# Patient Record
Sex: MALE | Race: WHITE | NOT HISPANIC OR LATINO
[De-identification: names, ages, dates, MRNs, and addresses within clinical notes are randomized per-mention and may not be internally consistent; named-entity substitution may affect disease eponyms.]

---

## 2017-01-03 ENCOUNTER — APPOINTMENT (OUTPATIENT)
Dept: DERMATOLOGY | Facility: CLINIC | Age: 67
End: 2017-01-03

## 2017-01-03 VITALS
DIASTOLIC BLOOD PRESSURE: 80 MMHG | BODY MASS INDEX: 26.07 KG/M2 | WEIGHT: 176 LBS | HEIGHT: 69 IN | SYSTOLIC BLOOD PRESSURE: 150 MMHG

## 2017-01-13 ENCOUNTER — APPOINTMENT (OUTPATIENT)
Dept: INTERNAL MEDICINE | Facility: CLINIC | Age: 67
End: 2017-01-13

## 2017-01-13 VITALS
HEART RATE: 81 BPM | DIASTOLIC BLOOD PRESSURE: 81 MMHG | BODY MASS INDEX: 26.66 KG/M2 | HEIGHT: 69 IN | WEIGHT: 180 LBS | SYSTOLIC BLOOD PRESSURE: 140 MMHG | OXYGEN SATURATION: 97 % | RESPIRATION RATE: 12 BRPM

## 2017-01-13 DIAGNOSIS — N52.9 MALE ERECTILE DYSFUNCTION, UNSPECIFIED: ICD-10-CM

## 2017-02-17 LAB
25(OH)D3 SERPL-MCNC: 25.5 NG/ML
ALBUMIN SERPL ELPH-MCNC: 4.3 G/DL
ALP BLD-CCNC: 55 U/L
ALT SERPL-CCNC: 26 U/L
ANION GAP SERPL CALC-SCNC: 20 MMOL/L
AST SERPL-CCNC: 29 U/L
BILIRUB SERPL-MCNC: 0.6 MG/DL
BUN SERPL-MCNC: 23 MG/DL
CALCIUM SERPL-MCNC: 9.6 MG/DL
CHLORIDE SERPL-SCNC: 100 MMOL/L
CHOLEST SERPL-MCNC: 193 MG/DL
CHOLEST/HDLC SERPL: 2.9 RATIO
CO2 SERPL-SCNC: 21 MMOL/L
CREAT SERPL-MCNC: 1 MG/DL
GLUCOSE SERPL-MCNC: 88 MG/DL
HBA1C MFR BLD HPLC: 5.3 %
HDLC SERPL-MCNC: 66 MG/DL
LDLC SERPL CALC-MCNC: NORMAL MG/DL
POTASSIUM SERPL-SCNC: 4.5 MMOL/L
PROT SERPL-MCNC: 7.2 G/DL
PSA FREE FLD-MCNC: 30.4 %
PSA FREE SERPL-MCNC: 0.2 NG/ML
PSA SERPL-MCNC: 0.66 NG/ML
SODIUM SERPL-SCNC: 141 MMOL/L
TRIGL SERPL-MCNC: 424 MG/DL

## 2017-03-01 ENCOUNTER — MEDICATION RENEWAL (OUTPATIENT)
Age: 67
End: 2017-03-01

## 2017-03-03 ENCOUNTER — MEDICATION RENEWAL (OUTPATIENT)
Age: 67
End: 2017-03-03

## 2017-03-17 ENCOUNTER — RX RENEWAL (OUTPATIENT)
Age: 67
End: 2017-03-17

## 2017-03-20 ENCOUNTER — MEDICATION RENEWAL (OUTPATIENT)
Age: 67
End: 2017-03-20

## 2017-04-17 ENCOUNTER — EMERGENCY (EMERGENCY)
Facility: HOSPITAL | Age: 67
LOS: 1 days | Discharge: HOME | End: 2017-04-17
Admitting: EMERGENCY MEDICINE
Payer: MEDICAID

## 2017-04-17 ENCOUNTER — APPOINTMENT (OUTPATIENT)
Dept: ORTHOPEDIC SURGERY | Facility: CLINIC | Age: 67
End: 2017-04-17

## 2017-04-17 VITALS — WEIGHT: 180 LBS | BODY MASS INDEX: 26.66 KG/M2 | HEIGHT: 69 IN

## 2017-04-17 DIAGNOSIS — Z87.39 PERSONAL HISTORY OF OTHER DISEASES OF THE MUSCULOSKELETAL SYSTEM AND CONNECTIVE TISSUE: ICD-10-CM

## 2017-04-17 PROCEDURE — 99283 EMERGENCY DEPT VISIT LOW MDM: CPT | Mod: 25

## 2017-04-17 PROCEDURE — 73140 X-RAY EXAM OF FINGER(S): CPT

## 2017-04-17 PROCEDURE — 73140 X-RAY EXAM OF FINGER(S): CPT | Mod: 26,RT

## 2017-04-20 ENCOUNTER — OUTPATIENT (OUTPATIENT)
Dept: OUTPATIENT SERVICES | Facility: HOSPITAL | Age: 67
LOS: 1 days | End: 2017-04-20
Payer: MEDICAID

## 2017-04-20 ENCOUNTER — APPOINTMENT (OUTPATIENT)
Dept: MRI IMAGING | Facility: HOSPITAL | Age: 67
End: 2017-04-20

## 2017-04-20 PROCEDURE — 73221 MRI JOINT UPR EXTREM W/O DYE: CPT

## 2017-04-21 ENCOUNTER — APPOINTMENT (OUTPATIENT)
Dept: ORTHOPEDIC SURGERY | Facility: CLINIC | Age: 67
End: 2017-04-21

## 2017-04-21 VITALS
HEART RATE: 87 BPM | SYSTOLIC BLOOD PRESSURE: 161 MMHG | WEIGHT: 180 LBS | DIASTOLIC BLOOD PRESSURE: 68 MMHG | BODY MASS INDEX: 26.66 KG/M2 | HEIGHT: 69 IN

## 2017-04-26 ENCOUNTER — APPOINTMENT (OUTPATIENT)
Dept: ORTHOPEDIC SURGERY | Facility: CLINIC | Age: 67
End: 2017-04-26

## 2017-04-26 VITALS
WEIGHT: 180 LBS | DIASTOLIC BLOOD PRESSURE: 76 MMHG | HEIGHT: 69 IN | BODY MASS INDEX: 26.66 KG/M2 | SYSTOLIC BLOOD PRESSURE: 126 MMHG | HEART RATE: 81 BPM

## 2017-05-01 ENCOUNTER — OUTPATIENT (OUTPATIENT)
Dept: OUTPATIENT SERVICES | Facility: HOSPITAL | Age: 67
LOS: 1 days | End: 2017-05-01
Payer: MEDICAID

## 2017-05-01 VITALS — HEIGHT: 69 IN | WEIGHT: 176.37 LBS | TEMPERATURE: 98 F

## 2017-05-01 DIAGNOSIS — Z90.89 ACQUIRED ABSENCE OF OTHER ORGANS: Chronic | ICD-10-CM

## 2017-05-01 DIAGNOSIS — S62.90XP: ICD-10-CM

## 2017-05-01 DIAGNOSIS — S52.90XA UNSPECIFIED FRACTURE OF UNSPECIFIED FOREARM, INITIAL ENCOUNTER FOR CLOSED FRACTURE: ICD-10-CM

## 2017-05-01 DIAGNOSIS — Z01.818 ENCOUNTER FOR OTHER PREPROCEDURAL EXAMINATION: ICD-10-CM

## 2017-05-01 PROCEDURE — 80048 BASIC METABOLIC PNL TOTAL CA: CPT

## 2017-05-01 PROCEDURE — 85027 COMPLETE CBC AUTOMATED: CPT

## 2017-05-01 PROCEDURE — 36415 COLL VENOUS BLD VENIPUNCTURE: CPT

## 2017-05-01 PROCEDURE — 93005 ELECTROCARDIOGRAM TRACING: CPT

## 2017-05-01 PROCEDURE — G0463: CPT

## 2017-05-01 PROCEDURE — 93010 ELECTROCARDIOGRAM REPORT: CPT | Mod: NC

## 2017-05-01 RX ORDER — ADALIMUMAB 40MG/0.8ML
0 KIT SUBCUTANEOUS
Qty: 0 | Refills: 0 | COMMUNITY

## 2017-05-01 NOTE — H&P PST ADULT - HISTORY OF PRESENT ILLNESS
This is a 66 y/o male who sustained left distal radius fracture s/p fall  presents with complaint of left wrist pain ,and limited ROM . patient states he fractured his wrist 3 years ago and applied cast that time . xray revealed non union fracture as per patient . scheduled for corrective osteotomy with ORIF and bone grafting left distal radius This is a 66 y/o male who sustained left distal radius fracture s/p fall  presents with complaint of left wrist pain ,and limited ROM . patient states he fractured his wrist 3 years ago and cast was applied  that time . x-ray revealed non union fracture as per patient . scheduled for corrective osteotomy with ORIF and bone grafting left distal radius

## 2017-05-01 NOTE — H&P PST ADULT - FAMILY HISTORY
Father  Still living? Yes, Estimated age: 81-90  Family history of diabetes mellitus in father, Age at diagnosis: Age Unknown

## 2017-05-01 NOTE — H&P PST ADULT - PROBLEM SELECTOR PLAN 1
corrective osteotomy with ORIF and bone grafting left distal radius   Medical clearance  Pre p instructions

## 2017-05-05 PROCEDURE — 73630 X-RAY EXAM OF FOOT: CPT | Mod: 26,RT

## 2017-05-05 PROCEDURE — 73620 X-RAY EXAM OF FOOT: CPT | Mod: 26,RT

## 2017-05-06 ENCOUNTER — INPATIENT (INPATIENT)
Facility: HOSPITAL | Age: 67
LOS: 0 days | Discharge: HOME | DRG: 603 | End: 2017-05-07
Attending: INTERNAL MEDICINE | Admitting: INTERNAL MEDICINE
Payer: MEDICAID

## 2017-05-06 DIAGNOSIS — Z90.89 ACQUIRED ABSENCE OF OTHER ORGANS: Chronic | ICD-10-CM
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
## 2017-05-06 DIAGNOSIS — M06.9 RHEUMATOID ARTHRITIS, UNSPECIFIED: ICD-10-CM
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
## 2017-05-06 DIAGNOSIS — D69.6 THROMBOCYTOPENIA, UNSPECIFIED: ICD-10-CM

## 2017-05-06 DIAGNOSIS — L03.115 CELLULITIS OF RIGHT LOWER LIMB: ICD-10-CM

## 2017-05-06 DIAGNOSIS — I10 ESSENTIAL (PRIMARY) HYPERTENSION: ICD-10-CM

## 2017-05-06 DIAGNOSIS — S91.339A PUNCTURE WOUND WITHOUT FOREIGN BODY, UNSPECIFIED FOOT, INITIAL ENCOUNTER: ICD-10-CM

## 2017-05-06 DIAGNOSIS — S91.331A PUNCTURE WOUND WITHOUT FOREIGN BODY, RIGHT FOOT, INITIAL ENCOUNTER: ICD-10-CM

## 2017-05-06 PROCEDURE — 99222 1ST HOSP IP/OBS MODERATE 55: CPT

## 2017-05-08 ENCOUNTER — APPOINTMENT (OUTPATIENT)
Age: 67
End: 2017-05-08

## 2017-05-08 VITALS
RESPIRATION RATE: 12 BRPM | HEIGHT: 69 IN | HEART RATE: 86 BPM | BODY MASS INDEX: 25.48 KG/M2 | DIASTOLIC BLOOD PRESSURE: 72 MMHG | TEMPERATURE: 98.7 F | SYSTOLIC BLOOD PRESSURE: 138 MMHG | WEIGHT: 172 LBS

## 2017-05-08 LAB
BASOPHILS # BLD AUTO: 0.01 K/UL
BASOPHILS NFR BLD AUTO: 0.1 %
EOSINOPHIL # BLD AUTO: 0.14 K/UL
EOSINOPHIL NFR BLD AUTO: 2.1 %
HCT VFR BLD CALC: 43.7 %
HGB BLD-MCNC: 15 G/DL
IMM GRANULOCYTES NFR BLD AUTO: 0.7 %
LYMPHOCYTES # BLD AUTO: 2.2 K/UL
LYMPHOCYTES NFR BLD AUTO: 32.6 %
MAN DIFF?: NORMAL
MCHC RBC-ENTMCNC: 32.5 PG
MCHC RBC-ENTMCNC: 34.3 GM/DL
MCV RBC AUTO: 94.6 FL
MONOCYTES # BLD AUTO: 0.48 K/UL
MONOCYTES NFR BLD AUTO: 7.1 %
NEUTROPHILS # BLD AUTO: 3.87 K/UL
NEUTROPHILS NFR BLD AUTO: 57.4 %
PLATELET # BLD AUTO: 130 K/UL
RBC # BLD: 4.62 M/UL
RBC # FLD: 12.5 %
WBC # FLD AUTO: 6.75 K/UL

## 2017-05-09 LAB
ALBUMIN SERPL ELPH-MCNC: 4.5 G/DL
ALP BLD-CCNC: 62 U/L
ALT SERPL-CCNC: 27 U/L
ANION GAP SERPL CALC-SCNC: 20 MMOL/L
AST SERPL-CCNC: 30 U/L
BILIRUB SERPL-MCNC: 0.6 MG/DL
BUN SERPL-MCNC: 28 MG/DL
CALCIUM SERPL-MCNC: 10.1 MG/DL
CHLORIDE SERPL-SCNC: 103 MMOL/L
CO2 SERPL-SCNC: 20 MMOL/L
CREAT SERPL-MCNC: 1.17 MG/DL
HCV RNA FLD QL NAA+PROBE: NORMAL
HCV RNA SPEC QL PROBE+SIG AMP: NOT DETECTED
POTASSIUM SERPL-SCNC: 4.5 MMOL/L
PROT SERPL-MCNC: 7.7 G/DL
SODIUM SERPL-SCNC: 143 MMOL/L

## 2017-05-10 ENCOUNTER — APPOINTMENT (OUTPATIENT)
Dept: ORTHOPEDIC SURGERY | Facility: CLINIC | Age: 67
End: 2017-05-10

## 2017-05-10 NOTE — ASU DISCHARGE PLAN (ADULT/PEDIATRIC). - MEDICATION SUMMARY - MEDICATIONS TO STOP TAKING
I will STOP taking the medications listed below when I get home from the hospital:    HYDROcodone-acetaminophen 7.5 mg-325 mg oral tablet  -- 1 tab(s) by mouth every 6 hours, As Needed

## 2017-05-10 NOTE — ASU DISCHARGE PLAN (ADULT/PEDIATRIC). - NOTIFY
Pain not relieved by Medications/Bleeding that does not stop/Fever greater than 101/Numbness, color, or temperature change to extremity

## 2017-05-11 ENCOUNTER — APPOINTMENT (OUTPATIENT)
Dept: FAMILY MEDICINE | Facility: CLINIC | Age: 67
End: 2017-05-11

## 2017-05-11 VITALS
TEMPERATURE: 98.7 F | WEIGHT: 180 LBS | OXYGEN SATURATION: 99 % | RESPIRATION RATE: 12 BRPM | BODY MASS INDEX: 29.99 KG/M2 | SYSTOLIC BLOOD PRESSURE: 143 MMHG | HEART RATE: 77 BPM | HEIGHT: 65 IN | DIASTOLIC BLOOD PRESSURE: 84 MMHG

## 2017-05-13 ENCOUNTER — APPOINTMENT (OUTPATIENT)
Dept: ULTRASOUND IMAGING | Facility: HOSPITAL | Age: 67
End: 2017-05-13

## 2017-05-13 ENCOUNTER — OUTPATIENT (OUTPATIENT)
Dept: OUTPATIENT SERVICES | Facility: HOSPITAL | Age: 67
LOS: 1 days | End: 2017-05-13
Payer: MEDICAID

## 2017-05-13 DIAGNOSIS — Z90.89 ACQUIRED ABSENCE OF OTHER ORGANS: Chronic | ICD-10-CM

## 2017-05-13 DIAGNOSIS — B19.20 UNSPECIFIED VIRAL HEPATITIS C WITHOUT HEPATIC COMA: ICD-10-CM

## 2017-05-13 DIAGNOSIS — K80.21 CALCULUS OF GALLBLADDER WITHOUT CHOLECYSTITIS WITH OBSTRUCTION: ICD-10-CM

## 2017-05-13 PROCEDURE — 76700 US EXAM ABDOM COMPLETE: CPT

## 2017-05-16 ENCOUNTER — RESULT REVIEW (OUTPATIENT)
Age: 67
End: 2017-05-16

## 2017-05-16 ENCOUNTER — OUTPATIENT (OUTPATIENT)
Dept: OUTPATIENT SERVICES | Facility: HOSPITAL | Age: 67
LOS: 1 days | End: 2017-05-16
Payer: MEDICAID

## 2017-05-16 ENCOUNTER — APPOINTMENT (OUTPATIENT)
Dept: ORTHOPEDIC SURGERY | Facility: HOSPITAL | Age: 67
End: 2017-05-16

## 2017-05-16 VITALS
HEART RATE: 78 BPM | WEIGHT: 175.27 LBS | RESPIRATION RATE: 16 BRPM | SYSTOLIC BLOOD PRESSURE: 126 MMHG | HEIGHT: 69 IN | TEMPERATURE: 98 F | DIASTOLIC BLOOD PRESSURE: 76 MMHG | OXYGEN SATURATION: 96 %

## 2017-05-16 VITALS — OXYGEN SATURATION: 100 % | HEART RATE: 82 BPM | RESPIRATION RATE: 16 BRPM

## 2017-05-16 DIAGNOSIS — Z01.818 ENCOUNTER FOR OTHER PREPROCEDURAL EXAMINATION: ICD-10-CM

## 2017-05-16 DIAGNOSIS — Z90.89 ACQUIRED ABSENCE OF OTHER ORGANS: Chronic | ICD-10-CM

## 2017-05-16 DIAGNOSIS — S62.90XP: ICD-10-CM

## 2017-05-16 PROCEDURE — 25607 OPTX DST RD XARTC FX/EPI SEP: CPT | Mod: LT

## 2017-05-16 PROCEDURE — C1889: CPT

## 2017-05-16 PROCEDURE — 76000 FLUOROSCOPY <1 HR PHYS/QHP: CPT | Mod: 26

## 2017-05-16 PROCEDURE — 88305 TISSUE EXAM BY PATHOLOGIST: CPT

## 2017-05-16 PROCEDURE — 88311 DECALCIFY TISSUE: CPT

## 2017-05-16 PROCEDURE — 25400 REPAIR RADIUS OR ULNA: CPT | Mod: LT

## 2017-05-16 PROCEDURE — C1713: CPT

## 2017-05-16 PROCEDURE — 88311 DECALCIFY TISSUE: CPT | Mod: 26

## 2017-05-16 PROCEDURE — 88305 TISSUE EXAM BY PATHOLOGIST: CPT | Mod: 26

## 2017-05-16 PROCEDURE — 76000 FLUOROSCOPY <1 HR PHYS/QHP: CPT

## 2017-05-16 RX ORDER — ONDANSETRON 8 MG/1
4 TABLET, FILM COATED ORAL ONCE
Qty: 0 | Refills: 0 | Status: DISCONTINUED | OUTPATIENT
Start: 2017-05-16 | End: 2017-05-17

## 2017-05-16 RX ORDER — CEFAZOLIN SODIUM 1 G
2000 VIAL (EA) INJECTION ONCE
Qty: 0 | Refills: 0 | Status: COMPLETED | OUTPATIENT
Start: 2017-05-16 | End: 2017-05-16

## 2017-05-16 RX ORDER — HYDROMORPHONE HYDROCHLORIDE 2 MG/ML
0.5 INJECTION INTRAMUSCULAR; INTRAVENOUS; SUBCUTANEOUS
Qty: 0 | Refills: 0 | Status: DISCONTINUED | OUTPATIENT
Start: 2017-05-16 | End: 2017-05-17

## 2017-05-16 RX ORDER — SODIUM CHLORIDE 9 MG/ML
1000 INJECTION, SOLUTION INTRAVENOUS
Qty: 0 | Refills: 0 | Status: DISCONTINUED | OUTPATIENT
Start: 2017-05-16 | End: 2017-05-17

## 2017-05-16 RX ORDER — BENZOCAINE AND MENTHOL 5; 1 G/100ML; G/100ML
1 LIQUID ORAL ONCE
Qty: 0 | Refills: 0 | Status: COMPLETED | OUTPATIENT
Start: 2017-05-16 | End: 2017-05-16

## 2017-05-16 RX ADMIN — SODIUM CHLORIDE 100 MILLILITER(S): 9 INJECTION, SOLUTION INTRAVENOUS at 20:27

## 2017-05-16 RX ADMIN — BENZOCAINE AND MENTHOL 1 LOZENGE: 5; 1 LIQUID ORAL at 20:33

## 2017-05-16 NOTE — BRIEF OPERATIVE NOTE - POST-OP DX
Other closed extra-articular fracture of distal end of left radius with malunion, subsequent encounter  05/16/2017    Active  Erick Disla

## 2017-05-17 ENCOUNTER — INPATIENT (INPATIENT)
Facility: HOSPITAL | Age: 67
LOS: 0 days | Discharge: HOME | DRG: 556 | End: 2017-05-18
Attending: ORTHOPAEDIC SURGERY | Admitting: ORTHOPAEDIC SURGERY
Payer: MEDICAID

## 2017-05-17 VITALS
SYSTOLIC BLOOD PRESSURE: 175 MMHG | HEIGHT: 67 IN | TEMPERATURE: 98 F | WEIGHT: 169.98 LBS | DIASTOLIC BLOOD PRESSURE: 98 MMHG | HEART RATE: 107 BPM | RESPIRATION RATE: 24 BRPM | OXYGEN SATURATION: 96 %

## 2017-05-17 DIAGNOSIS — Z90.89 ACQUIRED ABSENCE OF OTHER ORGANS: Chronic | ICD-10-CM

## 2017-05-17 DIAGNOSIS — R52 PAIN, UNSPECIFIED: ICD-10-CM

## 2017-05-17 DIAGNOSIS — M79.632 PAIN IN LEFT FOREARM: ICD-10-CM

## 2017-05-17 LAB
ANION GAP SERPL CALC-SCNC: 12 MMOL/L — SIGNIFICANT CHANGE UP (ref 5–17)
BASOPHILS # BLD AUTO: 0 K/UL — SIGNIFICANT CHANGE UP (ref 0–0.2)
BASOPHILS NFR BLD AUTO: 0.4 % — SIGNIFICANT CHANGE UP (ref 0–2)
BUN SERPL-MCNC: 16 MG/DL — SIGNIFICANT CHANGE UP (ref 7–23)
CALCIUM SERPL-MCNC: 9 MG/DL — SIGNIFICANT CHANGE UP (ref 8.4–10.5)
CHLORIDE SERPL-SCNC: 105 MMOL/L — SIGNIFICANT CHANGE UP (ref 96–108)
CK SERPL-CCNC: 601 U/L — HIGH (ref 39–308)
CO2 SERPL-SCNC: 24 MMOL/L — SIGNIFICANT CHANGE UP (ref 22–31)
CREAT SERPL-MCNC: 1.11 MG/DL — SIGNIFICANT CHANGE UP (ref 0.5–1.3)
EOSINOPHIL # BLD AUTO: 0 K/UL — SIGNIFICANT CHANGE UP (ref 0–0.5)
EOSINOPHIL NFR BLD AUTO: 0.5 % — SIGNIFICANT CHANGE UP (ref 0–6)
GLUCOSE SERPL-MCNC: 136 MG/DL — HIGH (ref 70–99)
HCT VFR BLD CALC: 33.9 % — LOW (ref 39–50)
HGB BLD-MCNC: 12.8 G/DL — LOW (ref 13–17)
LYMPHOCYTES # BLD AUTO: 1.1 K/UL — SIGNIFICANT CHANGE UP (ref 1–3.3)
LYMPHOCYTES # BLD AUTO: 12 % — LOW (ref 13–44)
MCHC RBC-ENTMCNC: 35.2 PG — HIGH (ref 27–34)
MCHC RBC-ENTMCNC: 37.7 GM/DL — HIGH (ref 32–36)
MCV RBC AUTO: 93.4 FL — SIGNIFICANT CHANGE UP (ref 80–100)
MONOCYTES # BLD AUTO: 0.5 K/UL — SIGNIFICANT CHANGE UP (ref 0–0.9)
MONOCYTES NFR BLD AUTO: 4.8 % — SIGNIFICANT CHANGE UP (ref 2–14)
NEUTROPHILS # BLD AUTO: 7.7 K/UL — HIGH (ref 1.8–7.4)
NEUTROPHILS NFR BLD AUTO: 82.3 % — HIGH (ref 43–77)
PLATELET # BLD AUTO: 79 K/UL — LOW (ref 150–400)
POTASSIUM SERPL-MCNC: 3.6 MMOL/L — SIGNIFICANT CHANGE UP (ref 3.5–5.3)
POTASSIUM SERPL-SCNC: 3.6 MMOL/L — SIGNIFICANT CHANGE UP (ref 3.5–5.3)
RBC # BLD: 3.63 M/UL — LOW (ref 4.2–5.8)
RBC # FLD: 11.3 % — SIGNIFICANT CHANGE UP (ref 10.3–14.5)
SODIUM SERPL-SCNC: 141 MMOL/L — SIGNIFICANT CHANGE UP (ref 135–145)
WBC # BLD: 9.4 K/UL — SIGNIFICANT CHANGE UP (ref 3.8–10.5)
WBC # FLD AUTO: 9.4 K/UL — SIGNIFICANT CHANGE UP (ref 3.8–10.5)

## 2017-05-17 PROCEDURE — 99284 EMERGENCY DEPT VISIT MOD MDM: CPT

## 2017-05-17 PROCEDURE — 99222 1ST HOSP IP/OBS MODERATE 55: CPT | Mod: 57

## 2017-05-17 RX ORDER — HYDROMORPHONE HYDROCHLORIDE 2 MG/ML
0.5 INJECTION INTRAMUSCULAR; INTRAVENOUS; SUBCUTANEOUS ONCE
Qty: 0 | Refills: 0 | Status: DISCONTINUED | OUTPATIENT
Start: 2017-05-17 | End: 2017-05-17

## 2017-05-17 RX ORDER — ONDANSETRON 8 MG/1
4 TABLET, FILM COATED ORAL EVERY 6 HOURS
Qty: 0 | Refills: 0 | Status: DISCONTINUED | OUTPATIENT
Start: 2017-05-17 | End: 2017-05-18

## 2017-05-17 RX ORDER — SODIUM CHLORIDE 9 MG/ML
1000 INJECTION INTRAMUSCULAR; INTRAVENOUS; SUBCUTANEOUS ONCE
Qty: 0 | Refills: 0 | Status: COMPLETED | OUTPATIENT
Start: 2017-05-17 | End: 2017-05-17

## 2017-05-17 RX ORDER — DOCUSATE SODIUM 100 MG
100 CAPSULE ORAL THREE TIMES A DAY
Qty: 0 | Refills: 0 | Status: DISCONTINUED | OUTPATIENT
Start: 2017-05-17 | End: 2017-05-18

## 2017-05-17 RX ORDER — HYDROMORPHONE HYDROCHLORIDE 2 MG/ML
4 INJECTION INTRAMUSCULAR; INTRAVENOUS; SUBCUTANEOUS
Qty: 0 | Refills: 0 | Status: DISCONTINUED | OUTPATIENT
Start: 2017-05-17 | End: 2017-05-18

## 2017-05-17 RX ORDER — LISINOPRIL 2.5 MG/1
10 TABLET ORAL DAILY
Qty: 0 | Refills: 0 | Status: DISCONTINUED | OUTPATIENT
Start: 2017-05-17 | End: 2017-05-18

## 2017-05-17 RX ORDER — HYDROMORPHONE HYDROCHLORIDE 2 MG/ML
1 INJECTION INTRAMUSCULAR; INTRAVENOUS; SUBCUTANEOUS
Qty: 0 | Refills: 0 | Status: DISCONTINUED | OUTPATIENT
Start: 2017-05-17 | End: 2017-05-18

## 2017-05-17 RX ORDER — CELECOXIB 200 MG/1
200 CAPSULE ORAL
Qty: 0 | Refills: 0 | Status: DISCONTINUED | OUTPATIENT
Start: 2017-05-17 | End: 2017-05-18

## 2017-05-17 RX ORDER — HYDROMORPHONE HYDROCHLORIDE 2 MG/ML
6 INJECTION INTRAMUSCULAR; INTRAVENOUS; SUBCUTANEOUS
Qty: 0 | Refills: 0 | Status: DISCONTINUED | OUTPATIENT
Start: 2017-05-17 | End: 2017-05-18

## 2017-05-17 RX ORDER — ZOLPIDEM TARTRATE 10 MG/1
5 TABLET ORAL AT BEDTIME
Qty: 0 | Refills: 0 | Status: DISCONTINUED | OUTPATIENT
Start: 2017-05-17 | End: 2017-05-18

## 2017-05-17 RX ADMIN — HYDROMORPHONE HYDROCHLORIDE 1 MILLIGRAM(S): 2 INJECTION INTRAMUSCULAR; INTRAVENOUS; SUBCUTANEOUS at 11:05

## 2017-05-17 RX ADMIN — HYDROMORPHONE HYDROCHLORIDE 6 MILLIGRAM(S): 2 INJECTION INTRAMUSCULAR; INTRAVENOUS; SUBCUTANEOUS at 16:14

## 2017-05-17 RX ADMIN — HYDROMORPHONE HYDROCHLORIDE 0.5 MILLIGRAM(S): 2 INJECTION INTRAMUSCULAR; INTRAVENOUS; SUBCUTANEOUS at 03:54

## 2017-05-17 RX ADMIN — HYDROMORPHONE HYDROCHLORIDE 6 MILLIGRAM(S): 2 INJECTION INTRAMUSCULAR; INTRAVENOUS; SUBCUTANEOUS at 13:24

## 2017-05-17 RX ADMIN — HYDROMORPHONE HYDROCHLORIDE 0.5 MILLIGRAM(S): 2 INJECTION INTRAMUSCULAR; INTRAVENOUS; SUBCUTANEOUS at 07:13

## 2017-05-17 RX ADMIN — HYDROMORPHONE HYDROCHLORIDE 1 MILLIGRAM(S): 2 INJECTION INTRAMUSCULAR; INTRAVENOUS; SUBCUTANEOUS at 16:56

## 2017-05-17 RX ADMIN — HYDROMORPHONE HYDROCHLORIDE 1 MILLIGRAM(S): 2 INJECTION INTRAMUSCULAR; INTRAVENOUS; SUBCUTANEOUS at 14:04

## 2017-05-17 RX ADMIN — HYDROMORPHONE HYDROCHLORIDE 1 MILLIGRAM(S): 2 INJECTION INTRAMUSCULAR; INTRAVENOUS; SUBCUTANEOUS at 14:03

## 2017-05-17 RX ADMIN — LISINOPRIL 10 MILLIGRAM(S): 2.5 TABLET ORAL at 09:55

## 2017-05-17 RX ADMIN — HYDROMORPHONE HYDROCHLORIDE 1 MILLIGRAM(S): 2 INJECTION INTRAMUSCULAR; INTRAVENOUS; SUBCUTANEOUS at 22:30

## 2017-05-17 RX ADMIN — HYDROMORPHONE HYDROCHLORIDE 6 MILLIGRAM(S): 2 INJECTION INTRAMUSCULAR; INTRAVENOUS; SUBCUTANEOUS at 13:26

## 2017-05-17 RX ADMIN — Medication 100 MILLIGRAM(S): at 20:42

## 2017-05-17 RX ADMIN — HYDROMORPHONE HYDROCHLORIDE 0.5 MILLIGRAM(S): 2 INJECTION INTRAMUSCULAR; INTRAVENOUS; SUBCUTANEOUS at 07:36

## 2017-05-17 RX ADMIN — CELECOXIB 200 MILLIGRAM(S): 200 CAPSULE ORAL at 20:49

## 2017-05-17 RX ADMIN — HYDROMORPHONE HYDROCHLORIDE 4 MILLIGRAM(S): 2 INJECTION INTRAMUSCULAR; INTRAVENOUS; SUBCUTANEOUS at 09:55

## 2017-05-17 RX ADMIN — HYDROMORPHONE HYDROCHLORIDE 6 MILLIGRAM(S): 2 INJECTION INTRAMUSCULAR; INTRAVENOUS; SUBCUTANEOUS at 21:31

## 2017-05-17 RX ADMIN — HYDROMORPHONE HYDROCHLORIDE 1 MILLIGRAM(S): 2 INJECTION INTRAMUSCULAR; INTRAVENOUS; SUBCUTANEOUS at 11:22

## 2017-05-17 RX ADMIN — HYDROMORPHONE HYDROCHLORIDE 0.5 MILLIGRAM(S): 2 INJECTION INTRAMUSCULAR; INTRAVENOUS; SUBCUTANEOUS at 08:00

## 2017-05-17 RX ADMIN — SODIUM CHLORIDE 500 MILLILITER(S): 9 INJECTION INTRAMUSCULAR; INTRAVENOUS; SUBCUTANEOUS at 07:11

## 2017-05-17 RX ADMIN — HYDROMORPHONE HYDROCHLORIDE 1 MILLIGRAM(S): 2 INJECTION INTRAMUSCULAR; INTRAVENOUS; SUBCUTANEOUS at 22:02

## 2017-05-17 RX ADMIN — HYDROMORPHONE HYDROCHLORIDE 1 MILLIGRAM(S): 2 INJECTION INTRAMUSCULAR; INTRAVENOUS; SUBCUTANEOUS at 16:57

## 2017-05-17 RX ADMIN — HYDROMORPHONE HYDROCHLORIDE 6 MILLIGRAM(S): 2 INJECTION INTRAMUSCULAR; INTRAVENOUS; SUBCUTANEOUS at 20:42

## 2017-05-17 RX ADMIN — CELECOXIB 200 MILLIGRAM(S): 200 CAPSULE ORAL at 20:41

## 2017-05-17 RX ADMIN — HYDROMORPHONE HYDROCHLORIDE 4 MILLIGRAM(S): 2 INJECTION INTRAMUSCULAR; INTRAVENOUS; SUBCUTANEOUS at 12:58

## 2017-05-17 NOTE — ED ADULT NURSE REASSESSMENT NOTE - NS ED NURSE REASSESS COMMENT FT1
Patient c/o pain in the left forearm and hand.  Left arm and hand warm to touch.  Positive sensation present in all fingers.  Patient was re-evaluated by MD Vernon.  Patient given dilaudid 0.5 as ordered.  Patient with some relief.  To be seen by orthopedics.
0851- Orthopedic PA in attendance
Patient resting on stretcher.  States having good relief with dilaudid 0.5 mg ivp.  Will follow.
0715- Pt received in intense pain @ operative site in left wrist. "I can't get comfortable. Can you take this thing off my wrist?" Pt is alert & oriented. Can't sit still on stretcher. Posterior splint & ace wrap in place to left wrist. Fingers swollen. Denies motor or sensory deficit in digits. Awaiting orthopedic consult. Dr Vernon aware of pain- pt reassessed.     0730- Pt med as per MD with Hydromorphone
0800- Pt resting more comfortably @ present. Left arm elevated on pillow. HL site in rt FA without signs of infiltrate. Wife bedside.
Patient had left forearm dressing taken down by MD Vernon.  Patients forearm left remains placed on splint.

## 2017-05-17 NOTE — CONSULT NOTE ADULT - ASSESSMENT
Would recommend continuing pain meds as ordered. Dilaudid 4mg po q3h prn for moderate pain. Dilaudid 6mg po q3h prn for severe pain. Dilaudid 1mg IV q3h prn for severe uncontrolled pain.

## 2017-05-17 NOTE — ED PROVIDER NOTE - PROGRESS NOTE DETAILS
pt resting - placed ace around splint - good sensation all fingers, good cap refill, 2+ radial pulse pain worsening again - feels hot and tight in top of left hand - pt is able to move all fingers, good sensation throughout, skin is warm, good cap refill all fingers, 2+ radial pulse. understands PA will be in about 0800 Patient seen by Orthopedic PA discuss case with Orthopedic attending who decided to admit the patient for further management and better control of pain.

## 2017-05-17 NOTE — PROGRESS NOTE ADULT - SUBJECTIVE AND OBJECTIVE BOX
Patient POD #1 s/p corrective osteotomy left distal radius fracture malunion with ORIF and bone grafting of same. Discharged home last night. Presented to [facility] this early AM with intractable left wrist pain, swelling. Admitted for pain control, observation.  Patient seen by me earlier today as well as late this evening. Currently noted to have been sleeping comfortably until awoken by me for my reassessment. Pain notes significant pain (up to 9/10 pain scale). Has been seen by Pain Management.   Significant edema on exam. Serial exams show compartments (dorsal hand, forearm) swollen but not tense. + pain with palpation greatest involving distal forearm (site of osteotomy). NVID (sensation intact, good cap refill).  neg significant increase in pain with passive stretch digits left hand.   Overall, clinically exam not c/w compartment syndrome.   Continue admission for now nonetheless.  continue elevation, ice, observation, NV checks, pain meds prn for now  consider d/c home tomorrow provided patient stable, pain adequately controlled on po pain meds at that time.

## 2017-05-17 NOTE — ED PROVIDER NOTE - OBJECTIVE STATEMENT
67 y.o. M had elective orthopedic surgery last night to correct malunion of left wrist s/p fx 2yr ago - had ORIF last evening about 8pm by Dr. Galindo here at [facility] - general anesthesia and possibly nerve block as described by patient - pt states has had increasing pain/burning in forearm tonight - last took 2 percocet at midnight - feels like vice on arm, hand was numb - now numbness is improving, but pain is severe, can't get comfortable 67 y.o. M had elective orthopedic surgery last night to correct malunion of left wrist s/p fx 2yr ago - had ORIF last evening about 8pm by Dr. Galindo here at [facility] - general anesthesia and possibly nerve block as described by patient - pt states has had increasing pain/burning in forearm tonight - last took 2 percocet at midnight - feels like vice on arm, hand was numb - now numbness is improving, but pain is severe, can't get comfortable   Ace wrap removed during history taking, webril opened -splint maintained under volar surface forearm - pt reports some improvement but still feels very tight.

## 2017-05-17 NOTE — H&P ADULT - HISTORY OF PRESENT ILLNESS
This is a 67 y.o. male 1 day s/p ORIF left distal radius fracture non-union who presented to [facility] with intractable pain.  He reports that he feels like his arm is on fire. Patient has been given IV Dilaudid with minimal relief. Discussed with Dr. Galindo and will admit patient for pain control and observation. Pain management consult to be called.

## 2017-05-17 NOTE — PROGRESS NOTE ADULT - SUBJECTIVE AND OBJECTIVE BOX
Patient came in with Increased pain POD 1 s/p Left Distal radius ORIF. Forearm compartments remain soft. Dressing changed, volar splint applied, extremity elevated. Patient is non compliant with all recommendations, would not keep the extremity elevated continuously despite recommendations. I explained to the patient possibility of compartment syndrome which is a surgical emergency if patient will not elevate the extremity. Patient understood. Patient partially removed newly applied dressing and splint.   Patient says " I know better, give me pain medications".     discussed the case with Dr. Galindo     will continue monitoring the patient and compartments, reinforcing recommendations.

## 2017-05-17 NOTE — ED PROVIDER NOTE - NOTES
0522 - will try to page Dr. OLIVERA again in the morning, have not heard back yet. Patient resting. 0522 - will try to page Dr. OLIVERA again in the morning, have not heard back yet. Patient resting comfortably.  0550 - Dr. SARAVIA calling back, will have PA come see patient in am around 0800 - will assess to see if needs admit or can be discharged home

## 2017-05-17 NOTE — CONSULT NOTE ADULT - SUBJECTIVE AND OBJECTIVE BOX
Pleasant 67 year old male S/P Left distal radius fracture repair for non-union 5-. States Percocet ineffective. Readmitted for increased swelling and uncontrolled pain. He states he occasionally radiated his left arm . Fingers mobile. Left upper extremity elevated. He states Dilaudid IV most effective at this time.

## 2017-05-17 NOTE — H&P ADULT - NSHPPHYSICALEXAM_GEN_ALL_CORE
Left wrist: volar splint removed. No active bleeding. + swelling hand, wrist and forearm.  Compartments supple. Sensation intact to light touch. brisk capillary refill all digits. Splint and dressing reapplied, loosely.

## 2017-05-17 NOTE — ED PROVIDER NOTE - CARE PLAN
Principal Discharge DX:	Pain of left forearm Principal Discharge DX:	Pain of left forearm  Secondary Diagnosis:	Intractable pain

## 2017-05-17 NOTE — ED PROVIDER NOTE - MEDICAL DECISION MAKING DETAILS
67 y.o. M presents about 6 hr post surgery to correct malunion of left wrist s/p old fracture - c/o severe pain, vice like, initially with numbness - splint loosened, pain meds given - pt comfortable at this time - awaiting ortho c/s

## 2017-05-18 ENCOUNTER — TRANSCRIPTION ENCOUNTER (OUTPATIENT)
Age: 67
End: 2017-05-18

## 2017-05-18 VITALS
RESPIRATION RATE: 16 BRPM | SYSTOLIC BLOOD PRESSURE: 170 MMHG | DIASTOLIC BLOOD PRESSURE: 93 MMHG | TEMPERATURE: 99 F | HEART RATE: 86 BPM | OXYGEN SATURATION: 96 %

## 2017-05-18 RX ORDER — ZOLPIDEM TARTRATE 10 MG/1
1 TABLET ORAL
Qty: 0 | Refills: 0 | DISCHARGE
Start: 2017-05-18

## 2017-05-18 RX ORDER — DOCUSATE SODIUM 100 MG
1 CAPSULE ORAL
Qty: 0 | Refills: 0 | COMMUNITY
Start: 2017-05-18

## 2017-05-18 RX ORDER — FAMOTIDINE 10 MG/ML
0 INJECTION INTRAVENOUS
Qty: 0 | Refills: 0 | COMMUNITY

## 2017-05-18 RX ORDER — LISINOPRIL 2.5 MG/1
1 TABLET ORAL
Qty: 0 | Refills: 0 | DISCHARGE
Start: 2017-05-18

## 2017-05-18 RX ORDER — LISINOPRIL 2.5 MG/1
1 TABLET ORAL
Qty: 0 | Refills: 0 | COMMUNITY

## 2017-05-18 RX ORDER — ZOLPIDEM TARTRATE 10 MG/1
1 TABLET ORAL
Qty: 0 | Refills: 0 | COMMUNITY

## 2017-05-18 RX ADMIN — HYDROMORPHONE HYDROCHLORIDE 6 MILLIGRAM(S): 2 INJECTION INTRAMUSCULAR; INTRAVENOUS; SUBCUTANEOUS at 12:07

## 2017-05-18 RX ADMIN — HYDROMORPHONE HYDROCHLORIDE 6 MILLIGRAM(S): 2 INJECTION INTRAMUSCULAR; INTRAVENOUS; SUBCUTANEOUS at 12:40

## 2017-05-18 RX ADMIN — ZOLPIDEM TARTRATE 5 MILLIGRAM(S): 10 TABLET ORAL at 02:35

## 2017-05-18 RX ADMIN — CELECOXIB 200 MILLIGRAM(S): 200 CAPSULE ORAL at 08:40

## 2017-05-18 RX ADMIN — HYDROMORPHONE HYDROCHLORIDE 1 MILLIGRAM(S): 2 INJECTION INTRAMUSCULAR; INTRAVENOUS; SUBCUTANEOUS at 09:51

## 2017-05-18 RX ADMIN — LISINOPRIL 10 MILLIGRAM(S): 2.5 TABLET ORAL at 08:41

## 2017-05-18 RX ADMIN — CELECOXIB 200 MILLIGRAM(S): 200 CAPSULE ORAL at 08:42

## 2017-05-18 RX ADMIN — Medication 100 MILLIGRAM(S): at 08:40

## 2017-05-18 RX ADMIN — HYDROMORPHONE HYDROCHLORIDE 1 MILLIGRAM(S): 2 INJECTION INTRAMUSCULAR; INTRAVENOUS; SUBCUTANEOUS at 10:10

## 2017-05-18 NOTE — DISCHARGE NOTE ADULT - PLAN OF CARE
Improve quality of life Keep dressing/splint clean and dry  Keep left arm elevated  ice packs to left wrist 20 minutes on/20 minutes off as needed while awake  do NOT bear weight on left upper extremity Call your doctor if you experience:  • An increase in pain not controlled by pain medication or change in activity or  position.  • Temperature greater than 101° F.  • Redness, increased swelling or foul smelling drainage from or around the  incision.  • Numbness, tingling or a change in color or temperature of the operative leg.  • Call your doctor immediately if you experience chest pain, shortness of breath or calf pain.    For Constipation :   • Increase your water intake. Drink at least 8 glasses of water daily.  • Try adding fiber to your diet by eating fruits, vegetables and foods that are rich in grains.  • If you do experience constipation, you may take an over-the-counter stool softener/laxative such as Colace, Senokot or Milk of Magnesia.

## 2017-05-18 NOTE — DISCHARGE NOTE ADULT - NS AS ACTIVITY OBS
Walking-Outdoors allowed/Stairs allowed/Walking-Indoors allowed/Showering allowed/No Heavy lifting/straining

## 2017-05-18 NOTE — DISCHARGE NOTE ADULT - CARE PROVIDER_API CALL
Markus Galindo), Orthopaedic Surgery; Sports Medicine  [address] [place] [address]  Phone: [phone]  Fax: [phone]

## 2017-05-18 NOTE — PROGRESS NOTE ADULT - SUBJECTIVE AND OBJECTIVE BOX
SUBJECTIVE    67y y.o. Male admitted for intractable left wrist pain after ORIF left wrist 5/16 .   Patient is alert and comfortable.    States he feels much better and would like to go home  Pain is controlled with current pain regimen.  Denies nausea, vomiting, chest pain, shortness of breath, abdominal pain or fever.   No new complaints.    OBJECTIVE    Vital Signs Last 24 Hrs  T(C): 37.2, Max: 37.8 (05-17 @ 23:30)  T(F): 98.9, Max: 100.1 (05-17 @ 23:30)  HR: 86 (80 - 96)  BP: 170/93 (151/81 - 184/79)  BP(mean): --  RR: 16 (16 - 18)  SpO2: 96% (93% - 99%)  I&O's Summary      PHYSICAL EXAM    Left wrist dressing/ splint  is clean, dry and intact.   Moderate swelling noted on left hand and fingers   Passive range of motion is acceptable to due postoperative pain.   Sensation to light touch is grossly intact distally.    Motor function distally is intact.   Capillary refill is less than 3 seconds. No cyanosis.                          12.8<L>  9.4   )-----------( 79<L>    ( 17 May 2017 04:01 )             33.9<L>  17 May 2017 04:01    17 May 2017 04:01    141    |  105    |  16     ----------------------------<  136<H>  3.6     |  24     |  1.11     Ca    9.0        17 May 2017 04:01        ASSESSMENT AND PLAN    - Orthopedically stable  - Continue physical therapy and occupational therapy  - None weight bearing- left upper extremity  - Keep left UE elevated, Ice PRN  - Pain control as clinically indicated  - Disposition:  Home today  - D/W Dr. Sol

## 2017-05-18 NOTE — DISCHARGE NOTE ADULT - MEDICATION SUMMARY - MEDICATIONS TO STOP TAKING
I will STOP taking the medications listed below when I get home from the hospital:    famotidine 20 mg oral tablet  --  by mouth   -- preop med    acetaminophen-oxycodone 325 mg-10 mg oral tablet  -- 1 tab(s) by mouth every 6 hours, As Needed -for moderate pain MDD:4  -- Caution federal law prohibits the transfer of this drug to any person other  than the person for whom it was prescribed.  May cause drowsiness.  Alcohol may intensify this effect.  Use care when operating dangerous machinery.  This prescription cannot be refilled.  This product contains acetaminophen.  Do not use  with any other product containing acetaminophen to prevent possible liver damage.  Using more of this medication than prescribed may cause serious breathing problems.

## 2017-05-18 NOTE — DISCHARGE NOTE ADULT - PATIENT PORTAL LINK FT
“You can access the [employer] Patient Portal, offered by [facility], by registering with the following website: [email or web address]”

## 2017-05-18 NOTE — DISCHARGE NOTE ADULT - CARE PLAN
Principal Discharge DX:	Pain of left forearm  Goal:	Improve quality of life  Instructions for follow-up, activity and diet:	Keep dressing/splint clean and dry  Keep left arm elevated  ice packs to left wrist 20 minutes on/20 minutes off as needed while awake  do NOT bear weight on left upper extremity  Instructions for follow-up, activity and diet:	Call your doctor if you experience:  • An increase in pain not controlled by pain medication or change in activity or  position.  • Temperature greater than 101° F.  • Redness, increased swelling or foul smelling drainage from or around the  incision.  • Numbness, tingling or a change in color or temperature of the operative leg.  • Call your doctor immediately if you experience chest pain, shortness of breath or calf pain.    For Constipation :   • Increase your water intake. Drink at least 8 glasses of water daily.  • Try adding fiber to your diet by eating fruits, vegetables and foods that are rich in grains.  • If you do experience constipation, you may take an over-the-counter stool softener/laxative such as Colace, Senokot or Milk of Magnesia.

## 2017-05-18 NOTE — DISCHARGE NOTE ADULT - HOSPITAL COURSE
67 y.o. male admitted on 5/17/17 with intractable left wrist pain s/p left writs ORIF on 5/16/17. Patient was evaluated by Dr. Sol. Compartments were measured and within normal limits. Pain management consult was obtained. Patient is now comfortable with a good prognosis and is stable for discharge today.

## 2017-05-22 ENCOUNTER — APPOINTMENT (OUTPATIENT)
Dept: ORTHOPEDIC SURGERY | Facility: CLINIC | Age: 67
End: 2017-05-22

## 2017-05-22 VITALS
BODY MASS INDEX: 29.99 KG/M2 | WEIGHT: 180 LBS | SYSTOLIC BLOOD PRESSURE: 156 MMHG | HEART RATE: 83 BPM | HEIGHT: 65 IN | DIASTOLIC BLOOD PRESSURE: 101 MMHG

## 2017-05-28 ENCOUNTER — RX RENEWAL (OUTPATIENT)
Age: 67
End: 2017-05-28

## 2017-05-31 ENCOUNTER — APPOINTMENT (OUTPATIENT)
Dept: ORTHOPEDIC SURGERY | Facility: CLINIC | Age: 67
End: 2017-05-31

## 2017-05-31 VITALS
BODY MASS INDEX: 29.99 KG/M2 | SYSTOLIC BLOOD PRESSURE: 132 MMHG | DIASTOLIC BLOOD PRESSURE: 79 MMHG | HEIGHT: 65 IN | HEART RATE: 98 BPM | WEIGHT: 180 LBS

## 2017-06-21 ENCOUNTER — APPOINTMENT (OUTPATIENT)
Dept: ORTHOPEDIC SURGERY | Facility: CLINIC | Age: 67
End: 2017-06-21

## 2017-06-21 VITALS
WEIGHT: 174 LBS | DIASTOLIC BLOOD PRESSURE: 78 MMHG | BODY MASS INDEX: 28.99 KG/M2 | HEART RATE: 84 BPM | HEIGHT: 65 IN | SYSTOLIC BLOOD PRESSURE: 149 MMHG

## 2017-06-28 ENCOUNTER — APPOINTMENT (OUTPATIENT)
Dept: ORTHOPEDIC SURGERY | Facility: CLINIC | Age: 67
End: 2017-06-28

## 2017-07-05 ENCOUNTER — APPOINTMENT (OUTPATIENT)
Dept: ORTHOPEDIC SURGERY | Facility: CLINIC | Age: 67
End: 2017-07-05

## 2017-07-17 ENCOUNTER — APPOINTMENT (OUTPATIENT)
Dept: ORTHOPEDIC SURGERY | Facility: CLINIC | Age: 67
End: 2017-07-17

## 2017-07-17 VITALS
HEART RATE: 83 BPM | SYSTOLIC BLOOD PRESSURE: 151 MMHG | BODY MASS INDEX: 28.99 KG/M2 | WEIGHT: 174 LBS | HEIGHT: 65 IN | DIASTOLIC BLOOD PRESSURE: 73 MMHG

## 2017-07-19 ENCOUNTER — OUTPATIENT (OUTPATIENT)
Dept: OUTPATIENT SERVICES | Facility: HOSPITAL | Age: 67
LOS: 1 days | End: 2017-07-19
Payer: MEDICAID

## 2017-07-19 ENCOUNTER — APPOINTMENT (OUTPATIENT)
Dept: FAMILY MEDICINE | Facility: CLINIC | Age: 67
End: 2017-07-19

## 2017-07-19 VITALS
WEIGHT: 173 LBS | HEIGHT: 69 IN | DIASTOLIC BLOOD PRESSURE: 61 MMHG | OXYGEN SATURATION: 99 % | HEART RATE: 71 BPM | BODY MASS INDEX: 25.62 KG/M2 | SYSTOLIC BLOOD PRESSURE: 122 MMHG | RESPIRATION RATE: 12 BRPM

## 2017-07-19 VITALS
HEART RATE: 75 BPM | RESPIRATION RATE: 14 BRPM | OXYGEN SATURATION: 97 % | WEIGHT: 169.98 LBS | DIASTOLIC BLOOD PRESSURE: 79 MMHG | TEMPERATURE: 98 F | SYSTOLIC BLOOD PRESSURE: 128 MMHG | HEIGHT: 70 IN

## 2017-07-19 DIAGNOSIS — L81.9 DISORDER OF PIGMENTATION, UNSPECIFIED: ICD-10-CM

## 2017-07-19 DIAGNOSIS — S52.552P OTHER EXTRAARTICULAR FRACTURE OF LOWER END OF LEFT RADIUS, SUBSEQUENT ENCOUNTER FOR CLOSED FRACTURE WITH MALUNION: ICD-10-CM

## 2017-07-19 DIAGNOSIS — Z90.89 ACQUIRED ABSENCE OF OTHER ORGANS: Chronic | ICD-10-CM

## 2017-07-19 DIAGNOSIS — Z01.818 ENCOUNTER FOR OTHER PREPROCEDURAL EXAMINATION: ICD-10-CM

## 2017-07-19 DIAGNOSIS — S66.812D STRAIN OF OTHER SPECIFIED MUSCLES, FASCIA AND TENDONS AT WRIST AND HAND LEVEL, LEFT HAND, SUBSEQUENT ENCOUNTER: ICD-10-CM

## 2017-07-19 DIAGNOSIS — Z98.890 OTHER SPECIFIED POSTPROCEDURAL STATES: Chronic | ICD-10-CM

## 2017-07-19 DIAGNOSIS — M79.642 PAIN IN LEFT HAND: ICD-10-CM

## 2017-07-19 LAB
ANION GAP SERPL CALC-SCNC: 7 MMOL/L — SIGNIFICANT CHANGE UP (ref 5–17)
BUN SERPL-MCNC: 27 MG/DL — HIGH (ref 7–23)
CALCIUM SERPL-MCNC: 8.8 MG/DL — SIGNIFICANT CHANGE UP (ref 8.4–10.5)
CHLORIDE SERPL-SCNC: 106 MMOL/L — SIGNIFICANT CHANGE UP (ref 96–108)
CO2 SERPL-SCNC: 27 MMOL/L — SIGNIFICANT CHANGE UP (ref 22–31)
CREAT SERPL-MCNC: 1.15 MG/DL — SIGNIFICANT CHANGE UP (ref 0.5–1.3)
GLUCOSE SERPL-MCNC: 116 MG/DL — HIGH (ref 70–99)
HCT VFR BLD CALC: 39 % — SIGNIFICANT CHANGE UP (ref 39–50)
HGB BLD-MCNC: 13.9 G/DL — SIGNIFICANT CHANGE UP (ref 13–17)
MCHC RBC-ENTMCNC: 33.1 PG — SIGNIFICANT CHANGE UP (ref 27–34)
MCHC RBC-ENTMCNC: 35.7 GM/DL — SIGNIFICANT CHANGE UP (ref 32–36)
MCV RBC AUTO: 92.6 FL — SIGNIFICANT CHANGE UP (ref 80–100)
PLATELET # BLD AUTO: 103 K/UL — LOW (ref 150–400)
POTASSIUM SERPL-MCNC: 4.9 MMOL/L — SIGNIFICANT CHANGE UP (ref 3.5–5.3)
POTASSIUM SERPL-SCNC: 4.9 MMOL/L — SIGNIFICANT CHANGE UP (ref 3.5–5.3)
RBC # BLD: 4.21 M/UL — SIGNIFICANT CHANGE UP (ref 4.2–5.8)
RBC # FLD: 11.1 % — SIGNIFICANT CHANGE UP (ref 10.3–14.5)
SODIUM SERPL-SCNC: 140 MMOL/L — SIGNIFICANT CHANGE UP (ref 135–145)
WBC # BLD: 5.7 K/UL — SIGNIFICANT CHANGE UP (ref 3.8–10.5)
WBC # FLD AUTO: 5.7 K/UL — SIGNIFICANT CHANGE UP (ref 3.8–10.5)

## 2017-07-19 PROCEDURE — G0463: CPT

## 2017-07-19 PROCEDURE — 36415 COLL VENOUS BLD VENIPUNCTURE: CPT

## 2017-07-19 PROCEDURE — 85027 COMPLETE CBC AUTOMATED: CPT

## 2017-07-19 PROCEDURE — 93005 ELECTROCARDIOGRAM TRACING: CPT

## 2017-07-19 PROCEDURE — 93010 ELECTROCARDIOGRAM REPORT: CPT | Mod: NC

## 2017-07-19 PROCEDURE — 80048 BASIC METABOLIC PNL TOTAL CA: CPT

## 2017-07-19 RX ORDER — OXYCODONE HYDROCHLORIDE 15 MG/1
15 TABLET ORAL
Qty: 90 | Refills: 0 | Status: DISCONTINUED | COMMUNITY
Start: 2017-05-22 | End: 2017-07-19

## 2017-07-19 RX ORDER — OXYCODONE HYDROCHLORIDE 15 MG/1
15 TABLET ORAL
Qty: 90 | Refills: 0 | Status: DISCONTINUED | COMMUNITY
Start: 2017-06-21 | End: 2017-07-19

## 2017-07-19 RX ORDER — HYDROCODONE/ACETAMINOPHEN 5 MG-500MG
CAPSULE ORAL
Refills: 0 | Status: DISCONTINUED | COMMUNITY
End: 2017-07-19

## 2017-07-19 RX ORDER — HYDROCODONE BITARTRATE AND ACETAMINOPHEN 7.5; 325 MG/1; MG/1
7.5-325 TABLET ORAL
Qty: 90 | Refills: 0 | Status: DISCONTINUED | COMMUNITY
Start: 2017-07-05 | End: 2017-07-19

## 2017-07-19 NOTE — H&P PST ADULT - PROBLEM SELECTOR PLAN 1
'LEFT WRIST TENDON TRANSFER  LEFT WRIST EXTENSOR TENOSYNOVECTOMY  LEFT WRIST REMOVAL OF HARDWARE MINI C-ARM" ON 7/25/17  Pre op instructions were reviewed and signed.  Patient will obtain medical clearance.

## 2017-07-19 NOTE — H&P PST ADULT - MUSCULOSKELETAL
details… detailed exam joint swelling/no joint erythema/decreased ROM due to pain/no calf tenderness

## 2017-07-19 NOTE — H&P PST ADULT - NEGATIVE ENMT SYMPTOMS
no nasal discharge/no throat pain/no nasal obstruction/no tinnitus/no dry mouth/no ear pain/no sinus symptoms/no nose bleeds/no recurrent cold sores/no abnormal taste sensation/no gum bleeding/no nasal congestion/no post-nasal discharge/no hearing difficulty/no vertigo/no dysphagia

## 2017-07-19 NOTE — H&P PST ADULT - MUSCULOSKELETAL COMMENTS
right hand, knee ankle     left wrist and thumb with decreased mobility and weakness and pain left wrist painful hardware, left thumb stiffness

## 2017-07-19 NOTE — H&P PST ADULT - HISTORY OF PRESENT ILLNESS
68 yo male is scheduled for "LEFT WRIST TENDON TRANSFER LEFT WRIST EXTENSOR TENOSYNOVECTOMY LEFT WRIST REMOVAL OF HARDWARE MINI C-ARM" on 7/25/17 with Chris Venegas MD.  Patient with history of left wrist fracture 3 years ago and s/p ORIF 5/16/17 and now painful hardware with tendon injury and inability to extend left thumb.

## 2017-07-19 NOTE — H&P PST ADULT - PMH
Hand pain, left  wrist and thumb  Hepatitis C  Resolved after Harvoni treatment  HTN (hypertension)    Insomnia    Rheumatoid arthritis Hand pain, left  wrist and thumb  Hepatitis C  Resolved after Harvoni treatment  HTN (hypertension)    Insomnia    Pigmented skin lesion    Rheumatoid arthritis

## 2017-07-19 NOTE — H&P PST ADULT - NSANTHOSAYNRD_GEN_A_CORE
No. CELESTE screening performed.  STOP BANG Legend: 0-2 = LOW Risk; 3-4 = INTERMEDIATE Risk; 5-8 = HIGH Risk

## 2017-07-20 NOTE — ASU DISCHARGE PLAN (ADULT/PEDIATRIC). - INSTRUCTIONS
- Call your doctor if you experience:  • An increase in pain not controlled by pain medication or change in activity or  position.  • Temperature greater than 101° F.  • Redness, increased swelling or foul smelling drainage from or around the  incision.  • Numbness, tingling or a change in color or temperature of the operative extremity.  • Call your doctor immediately if you experience chest pain, shortness of breath or calf pain.

## 2017-07-20 NOTE — ASU DISCHARGE PLAN (ADULT/PEDIATRIC). - NOTIFY
Numbness, color, or temperature change to extremity/Fever greater than 101/Pain not relieved by Medications/Swelling that continues/Increased Irritability or Sluggishness/Bleeding that does not stop/Inability to Tolerate Liquids or Foods/Persistent Nausea and Vomiting

## 2017-07-20 NOTE — ASU DISCHARGE PLAN (ADULT/PEDIATRIC). - MEDICATION SUMMARY - MEDICATIONS TO TAKE
I will START or STAY ON the medications listed below when I get home from the hospital:    Vicodin ES 7.5 mg-300 mg oral tablet  -- 1 tab(s) by mouth every 6 hours, As Needed - for severe pain, for moderate pain   -- [facility] Ref. # [id number]  -- Indication: For Pain    lisinopril 10 mg oral tablet  -- 1 tab(s) by mouth once a day  -- Indication: For High Blood Pressure    zolpidem 5 mg oral tablet  -- 1 tab(s) by mouth once a day (at bedtime), As needed, Insomnia  -- Indication: For Sleep    Humira Pen 40 mg/0.8 mL subcutaneous kit  --  subcutaneous every 2 weeks  -- Indication: For Immunosuppresant

## 2017-07-20 NOTE — ASU DISCHARGE PLAN (ADULT/PEDIATRIC). - SPECIAL INSTRUCTIONS
apply ice to operative site 20 minutes on and 20 minutes off for next 48 hours  Pain meds as per MD  Take an over the counter stool softener like Colace to avoid constipation while taking a narcotic for pain  elevate upper arm on pillows when lying down with sling

## 2017-07-23 PROCEDURE — 96375 TX/PRO/DX INJ NEW DRUG ADDON: CPT

## 2017-07-23 PROCEDURE — 96366 THER/PROPH/DIAG IV INF ADDON: CPT

## 2017-07-23 PROCEDURE — 85027 COMPLETE CBC AUTOMATED: CPT

## 2017-07-23 PROCEDURE — 73620 X-RAY EXAM OF FOOT: CPT

## 2017-07-23 PROCEDURE — 80053 COMPREHEN METABOLIC PANEL: CPT

## 2017-07-23 PROCEDURE — 83036 HEMOGLOBIN GLYCOSYLATED A1C: CPT

## 2017-07-23 PROCEDURE — 96376 TX/PRO/DX INJ SAME DRUG ADON: CPT

## 2017-07-23 PROCEDURE — G0378: CPT

## 2017-07-23 PROCEDURE — 96365 THER/PROPH/DIAG IV INF INIT: CPT

## 2017-07-23 PROCEDURE — 96372 THER/PROPH/DIAG INJ SC/IM: CPT | Mod: XU

## 2017-07-23 PROCEDURE — 96374 THER/PROPH/DIAG INJ IV PUSH: CPT

## 2017-07-23 PROCEDURE — 99285 EMERGENCY DEPT VISIT HI MDM: CPT | Mod: 25

## 2017-07-23 PROCEDURE — 73630 X-RAY EXAM OF FOOT: CPT

## 2017-07-23 PROCEDURE — 93005 ELECTROCARDIOGRAM TRACING: CPT

## 2017-07-23 PROCEDURE — 82550 ASSAY OF CK (CPK): CPT

## 2017-07-23 PROCEDURE — 87040 BLOOD CULTURE FOR BACTERIA: CPT

## 2017-07-23 PROCEDURE — 80048 BASIC METABOLIC PNL TOTAL CA: CPT

## 2017-07-23 PROCEDURE — 90471 IMMUNIZATION ADMIN: CPT

## 2017-07-24 ENCOUNTER — RX RENEWAL (OUTPATIENT)
Age: 67
End: 2017-07-24

## 2017-07-24 NOTE — ASU PATIENT PROFILE, ADULT - PMH
Hand pain, left  wrist and thumb  Hepatitis C  Resolved after Harvoni treatment  HTN (hypertension)    Insomnia    Pigmented skin lesion    Rheumatoid arthritis

## 2017-07-25 ENCOUNTER — OUTPATIENT (OUTPATIENT)
Dept: OUTPATIENT SERVICES | Facility: HOSPITAL | Age: 67
LOS: 1 days | End: 2017-07-25
Payer: MEDICAID

## 2017-07-25 ENCOUNTER — APPOINTMENT (OUTPATIENT)
Dept: ORTHOPEDIC SURGERY | Facility: HOSPITAL | Age: 67
End: 2017-07-25

## 2017-07-25 ENCOUNTER — TRANSCRIPTION ENCOUNTER (OUTPATIENT)
Age: 67
End: 2017-07-25

## 2017-07-25 VITALS
WEIGHT: 167.33 LBS | HEIGHT: 68 IN | HEART RATE: 67 BPM | DIASTOLIC BLOOD PRESSURE: 77 MMHG | TEMPERATURE: 98 F | RESPIRATION RATE: 14 BRPM | OXYGEN SATURATION: 100 % | SYSTOLIC BLOOD PRESSURE: 135 MMHG

## 2017-07-25 VITALS
DIASTOLIC BLOOD PRESSURE: 74 MMHG | RESPIRATION RATE: 18 BRPM | HEART RATE: 75 BPM | SYSTOLIC BLOOD PRESSURE: 112 MMHG | OXYGEN SATURATION: 100 %

## 2017-07-25 DIAGNOSIS — Z90.89 ACQUIRED ABSENCE OF OTHER ORGANS: Chronic | ICD-10-CM

## 2017-07-25 DIAGNOSIS — Z98.890 OTHER SPECIFIED POSTPROCEDURAL STATES: Chronic | ICD-10-CM

## 2017-07-25 DIAGNOSIS — S66.812D STRAIN OF OTHER SPECIFIED MUSCLES, FASCIA AND TENDONS AT WRIST AND HAND LEVEL, LEFT HAND, SUBSEQUENT ENCOUNTER: ICD-10-CM

## 2017-07-25 DIAGNOSIS — S52.552P OTHER EXTRAARTICULAR FRACTURE OF LOWER END OF LEFT RADIUS, SUBSEQUENT ENCOUNTER FOR CLOSED FRACTURE WITH MALUNION: ICD-10-CM

## 2017-07-25 PROCEDURE — 25116 REMOVE WRIST/FOREARM LESION: CPT | Mod: 59,LT

## 2017-07-25 PROCEDURE — 76000 FLUOROSCOPY <1 HR PHYS/QHP: CPT

## 2017-07-25 PROCEDURE — 25310 TRANSPLANT FOREARM TENDON: CPT | Mod: LT

## 2017-07-25 PROCEDURE — 25310 TRANSPLANT FOREARM TENDON: CPT | Mod: 22,LT

## 2017-07-25 PROCEDURE — 29125 APPL SHORT ARM SPLINT STATIC: CPT | Mod: 59,LT,79

## 2017-07-25 PROCEDURE — 76000 FLUOROSCOPY <1 HR PHYS/QHP: CPT | Mod: 26,LT

## 2017-07-25 RX ORDER — SODIUM CHLORIDE 9 MG/ML
1000 INJECTION, SOLUTION INTRAVENOUS
Qty: 0 | Refills: 0 | Status: DISCONTINUED | OUTPATIENT
Start: 2017-07-25 | End: 2017-07-26

## 2017-07-25 RX ORDER — CEFAZOLIN SODIUM 1 G
2000 VIAL (EA) INJECTION ONCE
Qty: 0 | Refills: 0 | Status: COMPLETED | OUTPATIENT
Start: 2017-07-25 | End: 2017-07-25

## 2017-07-25 RX ORDER — HYDROMORPHONE HYDROCHLORIDE 2 MG/ML
0.5 INJECTION INTRAMUSCULAR; INTRAVENOUS; SUBCUTANEOUS
Qty: 0 | Refills: 0 | Status: DISCONTINUED | OUTPATIENT
Start: 2017-07-25 | End: 2017-07-26

## 2017-07-25 RX ORDER — OXYCODONE HYDROCHLORIDE 5 MG/1
5 TABLET ORAL ONCE
Qty: 0 | Refills: 0 | Status: DISCONTINUED | OUTPATIENT
Start: 2017-07-25 | End: 2017-07-26

## 2017-07-25 RX ORDER — OXYCODONE HYDROCHLORIDE 5 MG/1
1 TABLET ORAL
Qty: 28 | Refills: 0
Start: 2017-07-25 | End: 2017-08-01

## 2017-07-25 RX ORDER — ONDANSETRON 8 MG/1
4 TABLET, FILM COATED ORAL ONCE
Qty: 0 | Refills: 0 | Status: DISCONTINUED | OUTPATIENT
Start: 2017-07-25 | End: 2017-07-26

## 2017-07-25 RX ADMIN — SODIUM CHLORIDE 100 MILLILITER(S): 9 INJECTION, SOLUTION INTRAVENOUS at 14:30

## 2017-08-02 ENCOUNTER — APPOINTMENT (OUTPATIENT)
Dept: ORTHOPEDIC SURGERY | Facility: CLINIC | Age: 67
End: 2017-08-02
Payer: MEDICAID

## 2017-08-02 PROCEDURE — 99024 POSTOP FOLLOW-UP VISIT: CPT

## 2017-08-02 PROCEDURE — A4590: CPT | Mod: 58,LT

## 2017-08-02 PROCEDURE — 29075 APPL CST ELBW FNGR SHORT ARM: CPT | Mod: 58,LT

## 2017-08-06 ENCOUNTER — EMERGENCY (EMERGENCY)
Facility: HOSPITAL | Age: 67
LOS: 1 days | Discharge: HOME | End: 2017-08-06
Admitting: EMERGENCY MEDICINE
Payer: MEDICAID

## 2017-08-06 DIAGNOSIS — Z98.890 OTHER SPECIFIED POSTPROCEDURAL STATES: Chronic | ICD-10-CM

## 2017-08-06 DIAGNOSIS — I10 ESSENTIAL (PRIMARY) HYPERTENSION: ICD-10-CM

## 2017-08-06 DIAGNOSIS — R06.02 SHORTNESS OF BREATH: ICD-10-CM

## 2017-08-06 DIAGNOSIS — Z79.899 OTHER LONG TERM (CURRENT) DRUG THERAPY: ICD-10-CM

## 2017-08-06 DIAGNOSIS — J45.901 UNSPECIFIED ASTHMA WITH (ACUTE) EXACERBATION: ICD-10-CM

## 2017-08-06 DIAGNOSIS — Z79.891 LONG TERM (CURRENT) USE OF OPIATE ANALGESIC: ICD-10-CM

## 2017-08-06 DIAGNOSIS — Z90.89 ACQUIRED ABSENCE OF OTHER ORGANS: Chronic | ICD-10-CM

## 2017-08-06 PROCEDURE — 99285 EMERGENCY DEPT VISIT HI MDM: CPT | Mod: 25

## 2017-08-06 PROCEDURE — 71010: CPT | Mod: 26

## 2017-08-06 PROCEDURE — 93010 ELECTROCARDIOGRAM REPORT: CPT

## 2017-08-07 PROCEDURE — 85027 COMPLETE CBC AUTOMATED: CPT

## 2017-08-07 PROCEDURE — 82550 ASSAY OF CK (CPK): CPT

## 2017-08-07 PROCEDURE — 85610 PROTHROMBIN TIME: CPT

## 2017-08-07 PROCEDURE — 85379 FIBRIN DEGRADATION QUANT: CPT

## 2017-08-07 PROCEDURE — 71045 X-RAY EXAM CHEST 1 VIEW: CPT

## 2017-08-07 PROCEDURE — 99285 EMERGENCY DEPT VISIT HI MDM: CPT | Mod: 25

## 2017-08-07 PROCEDURE — 84484 ASSAY OF TROPONIN QUANT: CPT

## 2017-08-07 PROCEDURE — 94640 AIRWAY INHALATION TREATMENT: CPT

## 2017-08-07 PROCEDURE — 85730 THROMBOPLASTIN TIME PARTIAL: CPT

## 2017-08-07 PROCEDURE — 93005 ELECTROCARDIOGRAM TRACING: CPT

## 2017-08-07 PROCEDURE — 96374 THER/PROPH/DIAG INJ IV PUSH: CPT

## 2017-08-07 PROCEDURE — 80053 COMPREHEN METABOLIC PANEL: CPT

## 2017-08-07 PROCEDURE — 83880 ASSAY OF NATRIURETIC PEPTIDE: CPT

## 2017-08-22 ENCOUNTER — APPOINTMENT (OUTPATIENT)
Dept: ORTHOPEDIC SURGERY | Facility: CLINIC | Age: 67
End: 2017-08-22
Payer: MEDICAID

## 2017-08-22 PROCEDURE — 99024 POSTOP FOLLOW-UP VISIT: CPT

## 2017-09-18 ENCOUNTER — MEDICATION RENEWAL (OUTPATIENT)
Age: 67
End: 2017-09-18

## 2017-10-23 ENCOUNTER — APPOINTMENT (OUTPATIENT)
Dept: INTERNAL MEDICINE | Facility: CLINIC | Age: 67
End: 2017-10-23
Payer: MEDICAID

## 2017-10-23 ENCOUNTER — MEDICATION RENEWAL (OUTPATIENT)
Age: 67
End: 2017-10-23

## 2017-10-23 VITALS
RESPIRATION RATE: 15 BRPM | WEIGHT: 173 LBS | HEART RATE: 83 BPM | OXYGEN SATURATION: 96 % | DIASTOLIC BLOOD PRESSURE: 80 MMHG | SYSTOLIC BLOOD PRESSURE: 129 MMHG | BODY MASS INDEX: 25.62 KG/M2 | TEMPERATURE: 97.8 F | HEIGHT: 69 IN

## 2017-10-23 PROCEDURE — 99215 OFFICE O/P EST HI 40 MIN: CPT

## 2017-11-02 ENCOUNTER — APPOINTMENT (OUTPATIENT)
Dept: ORTHOPEDIC SURGERY | Facility: CLINIC | Age: 67
End: 2017-11-02
Payer: MEDICAID

## 2017-11-02 VITALS — HEIGHT: 69 IN | BODY MASS INDEX: 25.18 KG/M2 | WEIGHT: 170 LBS

## 2017-11-02 PROCEDURE — 99214 OFFICE O/P EST MOD 30 MIN: CPT

## 2017-11-02 RX ORDER — ADALIMUMAB 40MG/0.8ML
40 KIT SUBCUTANEOUS
Qty: 2 | Refills: 0 | Status: ACTIVE | COMMUNITY
Start: 2017-04-26

## 2017-11-06 ENCOUNTER — APPOINTMENT (OUTPATIENT)
Age: 67
End: 2017-11-06

## 2017-12-16 NOTE — CONSULT NOTE ADULT - I WAS PHYSICALLY PRESENT FOR THE KEY PORTIONS OF THE EVALUATION AND MANAGEMENT (E/M) SERVICE PROVIDED.  I AGREE WITH THE ABOVE HISTORY, PHYSICAL, AND PLAN WHICH I HAVE REVIEWED AND EDITED WHERE APPROPRIATE
Problem: Patient Care Overview (Adult)  Goal: Plan of Care Review  Patient irritable, entitled and guarded. Angry and loudly responded to staff when asked if his homicidal but refused to to elaborate. Demanded to have xanax early this morning. Vitals stable. Attended only selected unit activities. Will continue to monitor patient.    Problem: Overarching Goals (Adult)  Goal: Develops/Participates in Therapeutic [place] to Support Successful Transition  Outcome: Ongoing (interventions implemented as appropriate)  Patient guarded and irritable.        Statement Selected

## 2017-12-23 ENCOUNTER — EMERGENCY (EMERGENCY)
Facility: HOSPITAL | Age: 67
LOS: 1 days | Discharge: HOME | End: 2017-12-23
Admitting: EMERGENCY MEDICINE
Payer: MEDICAID

## 2017-12-23 DIAGNOSIS — Z98.890 OTHER SPECIFIED POSTPROCEDURAL STATES: Chronic | ICD-10-CM
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
## 2017-12-23 DIAGNOSIS — S61.202A UNSPECIFIED OPEN WOUND OF RIGHT MIDDLE FINGER WITHOUT DAMAGE TO NAIL, INITIAL ENCOUNTER: ICD-10-CM

## 2017-12-23 DIAGNOSIS — S61.212A LACERATION WITHOUT FOREIGN BODY OF RIGHT MIDDLE FINGER WITHOUT DAMAGE TO NAIL, INITIAL ENCOUNTER: ICD-10-CM
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
## 2017-12-23 DIAGNOSIS — I10 ESSENTIAL (PRIMARY) HYPERTENSION: ICD-10-CM

## 2017-12-23 DIAGNOSIS — Z79.891 LONG TERM (CURRENT) USE OF OPIATE ANALGESIC: ICD-10-CM

## 2017-12-23 DIAGNOSIS — Z79.899 OTHER LONG TERM (CURRENT) DRUG THERAPY: ICD-10-CM
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
## 2017-12-23 DIAGNOSIS — Z90.89 ACQUIRED ABSENCE OF OTHER ORGANS: Chronic | ICD-10-CM

## 2017-12-23 PROCEDURE — 99282 EMERGENCY DEPT VISIT SF MDM: CPT

## 2017-12-23 PROCEDURE — 99283 EMERGENCY DEPT VISIT LOW MDM: CPT

## 2018-03-10 NOTE — ED ADULT NURSE REASSESSMENT NOTE - ANCILLARY STATUS
[facility] calls to report ETA between [phone] and midnight.       Farideh Martínez RN  03/09/18 2815 lab results pending/awaiting lab draw

## 2018-05-24 ENCOUNTER — APPOINTMENT (OUTPATIENT)
Dept: UROLOGY | Facility: CLINIC | Age: 68
End: 2018-05-24
Payer: MEDICAID

## 2018-05-24 ENCOUNTER — APPOINTMENT (OUTPATIENT)
Dept: UROLOGY | Facility: CLINIC | Age: 68
End: 2018-05-24

## 2018-05-24 VITALS
TEMPERATURE: 97.4 F | WEIGHT: 170 LBS | HEART RATE: 77 BPM | BODY MASS INDEX: 25.18 KG/M2 | SYSTOLIC BLOOD PRESSURE: 123 MMHG | OXYGEN SATURATION: 100 % | HEIGHT: 69 IN | DIASTOLIC BLOOD PRESSURE: 74 MMHG

## 2018-05-24 PROCEDURE — 99203 OFFICE O/P NEW LOW 30 MIN: CPT

## 2018-05-24 PROCEDURE — 51798 US URINE CAPACITY MEASURE: CPT

## 2018-05-24 RX ORDER — OXYCODONE AND ACETAMINOPHEN 5; 325 MG/1; MG/1
5-325 TABLET ORAL
Qty: 32 | Refills: 0 | Status: DISCONTINUED | COMMUNITY
Start: 2017-05-16 | End: 2018-05-24

## 2018-05-24 RX ORDER — AMOXICILLIN 500 MG/1
500 CAPSULE ORAL
Qty: 21 | Refills: 0 | Status: DISCONTINUED | COMMUNITY
Start: 2018-03-26

## 2018-05-24 RX ORDER — OXYCODONE HYDROCHLORIDE 15 MG/1
15 TABLET ORAL
Qty: 90 | Refills: 0 | Status: DISCONTINUED | COMMUNITY
Start: 2017-05-31 | End: 2018-05-24

## 2018-05-24 RX ORDER — LISINOPRIL 30 MG/1
TABLET ORAL
Refills: 0 | Status: DISCONTINUED | COMMUNITY
End: 2018-05-24

## 2018-05-24 RX ORDER — SILDENAFIL CITRATE 50 MG/1
50 TABLET, FILM COATED ORAL
Qty: 6 | Refills: 5 | Status: DISCONTINUED | COMMUNITY
Start: 2017-01-13 | End: 2018-05-24

## 2018-05-24 RX ORDER — PREDNISONE 20 MG/1
20 TABLET ORAL
Qty: 10 | Refills: 0 | Status: DISCONTINUED | COMMUNITY
Start: 2017-08-06 | End: 2018-05-24

## 2018-05-25 LAB
APPEARANCE: CLEAR
BACTERIA: NEGATIVE
BILIRUBIN URINE: NEGATIVE
BLOOD URINE: NEGATIVE
COLOR: YELLOW
GLUCOSE QUALITATIVE U: NEGATIVE MG/DL
HYALINE CASTS: 1 /LPF
KETONES URINE: NEGATIVE
LEUKOCYTE ESTERASE URINE: NEGATIVE
MICROSCOPIC-UA: NORMAL
NITRITE URINE: NEGATIVE
PH URINE: 5.5
PROTEIN URINE: 30 MG/DL
PSA SERPL-MCNC: 0.6 NG/ML
RED BLOOD CELLS URINE: 2 /HPF
SPECIFIC GRAVITY URINE: 1.03
SQUAMOUS EPITHELIAL CELLS: 0 /HPF
UROBILINOGEN URINE: NEGATIVE MG/DL
WHITE BLOOD CELLS URINE: 0 /HPF

## 2018-05-29 LAB
ANION GAP SERPL CALC-SCNC: 15 MMOL/L
BUN SERPL-MCNC: 32 MG/DL
CALCIUM SERPL-MCNC: 9.5 MG/DL
CHLORIDE SERPL-SCNC: 102 MMOL/L
CO2 SERPL-SCNC: 23 MMOL/L
CREAT SERPL-MCNC: 1.28 MG/DL
GLUCOSE SERPL-MCNC: 91 MG/DL
POTASSIUM SERPL-SCNC: 5.2 MMOL/L
SODIUM SERPL-SCNC: 140 MMOL/L

## 2018-06-14 ENCOUNTER — APPOINTMENT (OUTPATIENT)
Dept: UROLOGY | Facility: CLINIC | Age: 68
End: 2018-06-14
Payer: MEDICAID

## 2018-06-14 PROCEDURE — 99213 OFFICE O/P EST LOW 20 MIN: CPT

## 2018-06-20 ENCOUNTER — OTHER (OUTPATIENT)
Age: 68
End: 2018-06-20

## 2018-08-16 NOTE — H&P PST ADULT - NSANTHOSAYNRD_GEN_A_CORE
No. CELESTE screening performed.  STOP BANG Legend: 0-2 = LOW Risk; 3-4 = INTERMEDIATE Risk; 5-8 = HIGH Risk
none

## 2018-09-19 ENCOUNTER — EMERGENCY (EMERGENCY)
Facility: HOSPITAL | Age: 68
LOS: 1 days | Discharge: HOME | End: 2018-09-19
Attending: EMERGENCY MEDICINE | Admitting: EMERGENCY MEDICINE
Payer: MEDICARE

## 2018-09-19 VITALS
TEMPERATURE: 98 F | RESPIRATION RATE: 18 BRPM | SYSTOLIC BLOOD PRESSURE: 148 MMHG | OXYGEN SATURATION: 96 % | HEIGHT: 70 IN | HEART RATE: 85 BPM | WEIGHT: 175.05 LBS | DIASTOLIC BLOOD PRESSURE: 84 MMHG

## 2018-09-19 DIAGNOSIS — Z98.890 OTHER SPECIFIED POSTPROCEDURAL STATES: Chronic | ICD-10-CM

## 2018-09-19 DIAGNOSIS — Z90.89 ACQUIRED ABSENCE OF OTHER ORGANS: Chronic | ICD-10-CM

## 2018-09-19 PROCEDURE — 99282 EMERGENCY DEPT VISIT SF MDM: CPT

## 2018-09-19 NOTE — ED PROVIDER NOTE - PROGRESS NOTE DETAILS
Saulo CORRAL: Pt eloped from emergency room prior to receiving first dose of antibiotics or xray. attempted to contact pt, pt sts he is refusing treatment, does not want to come back to the ED does not want antibiotics. Attempted to discuss risks of refusing medical attention with pt however pt again sts he does not want treatment from the emergency room and hung up the phone.

## 2018-09-19 NOTE — ED PROVIDER NOTE - CHPI ED SYMPTOMS NEG
no red streaks/no drainage/no purulent drainage/no chills/no bleeding/no bleeding at site/no fever/no vomiting/no redness

## 2018-09-19 NOTE — ED ADULT NURSE NOTE - NSIMPLEMENTINTERV_GEN_ALL_ED
Implemented All Universal Safety Interventions:  [place] to call system. Call bell, personal items and telephone within reach. Instruct patient to call for assistance. Room bathroom lighting operational. Non-slip footwear when patient is off stretcher. Physically safe environment: no spills, clutter or unnecessary equipment. Stretcher in lowest position, wheels locked, appropriate side rails in place.

## 2018-09-19 NOTE — ED PROVIDER NOTE - MEDICAL DECISION MAKING DETAILS
67 y/o M with pmhx of HTN, and HEP C presenting to the ED with c/o left foot pain since 9/16/18.  Cellulitis of left foot. xray and antibiotics.

## 2018-09-19 NOTE — ED PROVIDER NOTE - OBJECTIVE STATEMENT
69 y/o M with pmhx of HTN, and HEP C presenting to the ED with c/o left foot pain since 9/16/18.  Pt reports he was at home attempting to power wash his deck barefoot when he accidently sprayed over his left foot causing a portion of his skin to avulse. He sts he has been using hydrogen peroxide and new skin to treat the wound. He admits the foot is swollen and the pain has been worsening. He denies fever, chills, CP, abd pain, leg pain, limitation in range of motion of the foot of toes, numbness or tingling of the left lower ext. He denies expanding redness or discharge.

## 2018-09-20 PROBLEM — L81.9 DISORDER OF PIGMENTATION, UNSPECIFIED: Chronic | Status: ACTIVE | Noted: 2017-07-19

## 2018-09-20 PROBLEM — M79.642 PAIN IN LEFT HAND: Chronic | Status: ACTIVE | Noted: 2017-07-19

## 2018-09-20 PROBLEM — I10 ESSENTIAL (PRIMARY) HYPERTENSION: Chronic | Status: ACTIVE | Noted: 2017-05-01

## 2018-09-20 PROBLEM — M06.9 RHEUMATOID ARTHRITIS, UNSPECIFIED: Chronic | Status: ACTIVE | Noted: 2017-05-01

## 2018-09-20 PROBLEM — G47.00 INSOMNIA, UNSPECIFIED: Chronic | Status: ACTIVE | Noted: 2017-07-19

## 2018-12-19 ENCOUNTER — APPOINTMENT (OUTPATIENT)
Dept: UROLOGY | Facility: CLINIC | Age: 68
End: 2018-12-19

## 2019-06-03 PROBLEM — N52.9 MALE ERECTILE DISORDER: Status: ACTIVE | Noted: 2017-01-13

## 2019-10-29 ENCOUNTER — APPOINTMENT (OUTPATIENT)
Dept: CT IMAGING | Facility: HOSPITAL | Age: 69
End: 2019-10-29

## 2019-11-12 ENCOUNTER — OUTPATIENT (OUTPATIENT)
Dept: OUTPATIENT SERVICES | Facility: HOSPITAL | Age: 69
LOS: 1 days | End: 2019-11-12
Payer: COMMERCIAL

## 2019-11-12 ENCOUNTER — APPOINTMENT (OUTPATIENT)
Dept: CT IMAGING | Facility: HOSPITAL | Age: 69
End: 2019-11-12
Payer: COMMERCIAL

## 2019-11-12 DIAGNOSIS — N28.1 CYST OF KIDNEY, ACQUIRED: ICD-10-CM

## 2019-11-12 DIAGNOSIS — Z90.89 ACQUIRED ABSENCE OF OTHER ORGANS: Chronic | ICD-10-CM

## 2019-11-12 DIAGNOSIS — Z98.890 OTHER SPECIFIED POSTPROCEDURAL STATES: Chronic | ICD-10-CM

## 2019-11-12 PROCEDURE — 74177 CT ABD & PELVIS W/CONTRAST: CPT | Mod: 26

## 2019-11-12 PROCEDURE — 74177 CT ABD & PELVIS W/CONTRAST: CPT

## 2020-01-01 NOTE — ASU PATIENT PROFILE, ADULT - PRO ARRIVE FROM
Spoke to mom in great detail about covid protocols for quarantining/testing.  Dad tested positive on Tues of this week, but started with symptoms last Wed and initially test came back inconclusive.      Mom had cold symptoms last week, did test negative, but woke this morning with loss of taste.  She thinks she is now positive, but did not say she is going to be re-tested.      Pt has had a runny nose for 2-3 weeks but that is the only symptom.    Mom states per the Health Dept, pt and sibling and mom are beginning a 14 day quarantine from when Dad ends his 10 day quarantine.      Mom states pt does seem to be getting better, was breathing harder/snoring at night, but is not as bad now.  Gave mom home care for congestion with steamy bathroom, saline drops in nares and suctioning and humidifier use in room    Mom will call back if she decides to have pt tested    FYI to    home

## 2020-02-12 ENCOUNTER — APPOINTMENT (OUTPATIENT)
Dept: UROLOGY | Facility: CLINIC | Age: 70
End: 2020-02-12
Payer: COMMERCIAL

## 2020-02-12 ENCOUNTER — APPOINTMENT (OUTPATIENT)
Dept: GASTROENTEROLOGY | Facility: CLINIC | Age: 70
End: 2020-02-12
Payer: COMMERCIAL

## 2020-02-12 VITALS
SYSTOLIC BLOOD PRESSURE: 140 MMHG | HEART RATE: 80 BPM | OXYGEN SATURATION: 96 % | WEIGHT: 167 LBS | RESPIRATION RATE: 15 BRPM | HEIGHT: 69 IN | BODY MASS INDEX: 24.73 KG/M2 | DIASTOLIC BLOOD PRESSURE: 80 MMHG | TEMPERATURE: 97.7 F

## 2020-02-12 DIAGNOSIS — B18.2 CHRONIC VIRAL HEPATITIS C: ICD-10-CM

## 2020-02-12 PROCEDURE — 51798 US URINE CAPACITY MEASURE: CPT | Mod: 59

## 2020-02-12 PROCEDURE — 99204 OFFICE O/P NEW MOD 45 MIN: CPT

## 2020-02-12 PROCEDURE — 99213 OFFICE O/P EST LOW 20 MIN: CPT | Mod: 25

## 2020-02-12 PROCEDURE — 51741 ELECTRO-UROFLOWMETRY FIRST: CPT

## 2020-02-12 NOTE — REVIEW OF SYSTEMS
[Arthralgias] : arthralgias [As Noted in HPI] : as noted in HPI [Joint Pain] : joint pain [Negative] : Heme/Lymph

## 2020-02-12 NOTE — ASSESSMENT
[FreeTextEntry1] : Impression is that of chronic diarrhea with ddx that includes chronic infection, IBD and microscopic colitis.\par \par Will send off stool studies.\par \par Due for colonoscopy.\par \par I have asked the patient to schedule a colonoscopy in the near future. I have reviewed the risks benefits and alternatives and provided the patient literature to read.  I have emphasized the need to have a good clean out including adequate fluid intake and avoiding seeds for one week prior to the procedure.\par \par Given history of chronic hep c will check RNA. Advance to clear liquids today then solid food tomorrow in AM as per GI. RD discussed low fat diet order with pt at bedside and pt agreeable. recs d/w LIP Currently NPO < 5 days

## 2020-02-12 NOTE — PHYSICAL EXAM
[General Appearance - In No Acute Distress] : in no acute distress [General Appearance - Alert] : alert [PERRL With Normal Accommodation] : pupils were equal in size, round, and reactive to light [Sclera] : the sclera and conjunctiva were normal [Extraocular Movements] : extraocular movements were intact [Oropharynx] : the oropharynx was normal [Neck Appearance] : the appearance of the neck was normal [Outer Ear] : the ears and nose were normal in appearance [Neck Cervical Mass (___cm)] : no neck mass was observed [Thyroid Diffuse Enlargement] : the thyroid was not enlarged [Jugular Venous Distention Increased] : there was no jugular-venous distention [Auscultation Breath Sounds / Voice Sounds] : lungs were clear to auscultation bilaterally [Thyroid Nodule] : there were no palpable thyroid nodules [Heart Rate And Rhythm] : heart rate was normal and rhythm regular [Heart Sounds] : normal S1 and S2 [Heart Sounds Gallop] : no gallops [Heart Sounds Pericardial Friction Rub] : no pericardial rub [Murmurs] : no murmurs [Edema] : there was no peripheral edema [Abdomen Soft] : soft [Bowel Sounds] : normal bowel sounds [Abdomen Tenderness] : non-tender [Abdomen Mass (___ Cm)] : no abdominal mass palpated [Cervical Lymph Nodes Enlarged Posterior Bilaterally] : posterior cervical [Cervical Lymph Nodes Enlarged Anterior Bilaterally] : anterior cervical [No CVA Tenderness] : no ~M costovertebral angle tenderness [No Spinal Tenderness] : no spinal tenderness [Abnormal Walk] : normal gait [Nail Clubbing] : no clubbing  or cyanosis of the fingernails [Musculoskeletal - Swelling] : no joint swelling seen [Motor Tone] : muscle strength and tone were normal [Skin Color & Pigmentation] : normal skin color and pigmentation [Skin Turgor] : normal skin turgor [] : no rash [No Focal Deficits] : no focal deficits [Oriented To Time, Place, And Person] : oriented to person, place, and time [Impaired Insight] : insight and judgment were intact [Affect] : the affect was normal

## 2020-02-12 NOTE — ASSESSMENT
[FreeTextEntry1] : Mr. Bocanegra was initially seen for urinary frequency and nocturia.  He was treated with alfuzosin with good response.  He did not return for follow-up.  Apparently he stopped the alfuzosin because of ejaculatory dysfunction.  He was subsequently seen by urologist in [place] who started him on tamsulosin and oxybutynin.  He had some symptomatic improvement.  His flow rate is low and his postvoid residual was 84 cc.  We discussed this.  Since he is improved on tamsulosin we will increase it to 2 tablets/day.  I instructed him to stop the oxybutynin.  We will see him in follow-up in 3 weeks and assess his progress.  Previously we discussed minimally invasive therapies such as the UroLift.  We will decide on future management based on his response to medical management.  Marked improvement on alfuzosin\par Pleased and not interested in intervention\par We discussed intervention approaches including urolift\par We discussed maximal medical therapy\par Labs reviewed\par Patient is not interested in intervention and is pleased with response to medication\par FU 6 months\par \par PLAN\par flomax will increase  to 2 tabs\par stop oxybutynin\par psa\par bmp\par ua\par 6 weeks

## 2020-02-12 NOTE — HISTORY OF PRESENT ILLNESS
[de-identified] : The patient reports that he is having up to 7 BM's daily with loose stools and urgency.  No rectal bleeding or mucous laden stool..  No family hx of IBD.  Exercises and eats a high fiber diet.  Weight stable.  No hb/odyno/dysph or satiety.  No family history of cancer.\par \par Chronic Hep C, treated successfully.

## 2020-02-12 NOTE — PHYSICAL EXAM
[Normal Appearance] : normal appearance [General Appearance - Well Nourished] : well nourished [Well Groomed] : well groomed [General Appearance - Well Developed] : well developed [General Appearance - In No Acute Distress] : no acute distress [Abdomen Tenderness] : non-tender [Abdomen Soft] : soft [Urethral Meatus] : meatus normal [Costovertebral Angle Tenderness] : no ~M costovertebral angle tenderness [Testes Mass (___cm)] : there were no testicular masses [Urinary Bladder Findings] : the bladder was normal on palpation [Scrotum] : the scrotum was normal [Skin Color & Pigmentation] : normal skin color and pigmentation [No Prostate Nodules] : no prostate nodules [Oriented To Time, Place, And Person] : oriented to person, place, and time [Edema] : no peripheral edema [Normal Station and Gait] : the gait and station were normal for the patient's age [] : no respiratory distress [Inguinal Lymph Nodes Enlarged Bilaterally] : inguinal [Motor Exam] : the motor exam was normal [FreeTextEntry1] : flow peak f;low 9.0 cc/sec  voided volume 112

## 2020-02-13 ENCOUNTER — LABORATORY RESULT (OUTPATIENT)
Age: 70
End: 2020-02-13

## 2020-02-14 LAB
ANION GAP SERPL CALC-SCNC: 16 MMOL/L
APPEARANCE: CLEAR
BACTERIA: NEGATIVE
BILIRUBIN URINE: NEGATIVE
BLOOD URINE: NEGATIVE
BUN SERPL-MCNC: 16 MG/DL
CALCIUM SERPL-MCNC: 9.7 MG/DL
CHLORIDE SERPL-SCNC: 105 MMOL/L
CO2 SERPL-SCNC: 23 MMOL/L
COLOR: YELLOW
CREAT SERPL-MCNC: 0.81 MG/DL
G LAMBLIA AG STL QL: NORMAL
GLUCOSE QUALITATIVE U: NEGATIVE
GLUCOSE SERPL-MCNC: 83 MG/DL
HCV RNA FLD QL NAA+PROBE: NORMAL
HCV RNA SPEC QL PROBE+SIG AMP: NOT DETECTED
HYALINE CASTS: 0 /LPF
KETONES URINE: NEGATIVE
LEUKOCYTE ESTERASE URINE: NEGATIVE
MICROSCOPIC-UA: NORMAL
NITRITE URINE: NEGATIVE
PH URINE: 6
POTASSIUM SERPL-SCNC: 4.2 MMOL/L
PROTEIN URINE: ABNORMAL
PSA SERPL-MCNC: 1.42 NG/ML
RED BLOOD CELLS URINE: 2 /HPF
SODIUM SERPL-SCNC: 144 MMOL/L
SPECIFIC GRAVITY URINE: 1.02
SQUAMOUS EPITHELIAL CELLS: 0 /HPF
UROBILINOGEN URINE: NORMAL
WHITE BLOOD CELLS URINE: 0 /HPF
WRIGHT STN STL: NEGATIVE

## 2020-02-18 LAB — BACTERIA STL CULT: NORMAL

## 2020-02-19 ENCOUNTER — TRANSCRIPTION ENCOUNTER (OUTPATIENT)
Age: 70
End: 2020-02-19

## 2020-02-19 LAB
C DIFF TOX B STL QL CT TISS CULT: NORMAL
FAT STL QN: NORMAL
FAT STL QN: NORMAL
Lab: NORMAL

## 2020-03-05 ENCOUNTER — TRANSCRIPTION ENCOUNTER (OUTPATIENT)
Age: 70
End: 2020-03-05

## 2020-06-04 ENCOUNTER — APPOINTMENT (OUTPATIENT)
Dept: INTERNAL MEDICINE | Facility: CLINIC | Age: 70
End: 2020-06-04
Payer: COMMERCIAL

## 2020-06-04 VITALS
DIASTOLIC BLOOD PRESSURE: 84 MMHG | RESPIRATION RATE: 16 BRPM | HEIGHT: 69 IN | BODY MASS INDEX: 25.18 KG/M2 | HEART RATE: 74 BPM | WEIGHT: 170 LBS | SYSTOLIC BLOOD PRESSURE: 190 MMHG

## 2020-06-04 PROCEDURE — 99205 OFFICE O/P NEW HI 60 MIN: CPT | Mod: 25

## 2020-06-04 PROCEDURE — 36415 COLL VENOUS BLD VENIPUNCTURE: CPT

## 2020-06-04 RX ORDER — TIZANIDINE 4 MG/1
4 TABLET ORAL EVERY 8 HOURS
Qty: 1 | Refills: 0 | Status: DISCONTINUED | COMMUNITY
Start: 2017-03-01 | End: 2020-06-04

## 2020-06-04 RX ORDER — ALFUZOSIN HYDROCHLORIDE 10 MG/1
10 TABLET, EXTENDED RELEASE ORAL
Qty: 90 | Refills: 1 | Status: DISCONTINUED | COMMUNITY
Start: 2018-05-24 | End: 2020-06-04

## 2020-06-04 RX ORDER — SODIUM SULFATE, POTASSIUM SULFATE, MAGNESIUM SULFATE 17.5; 3.13; 1.6 G/ML; G/ML; G/ML
17.5-3.13-1.6 SOLUTION, CONCENTRATE ORAL
Qty: 1 | Refills: 0 | Status: DISCONTINUED | COMMUNITY
Start: 2020-02-12 | End: 2020-06-04

## 2020-06-04 NOTE — PHYSICAL EXAM
[General Appearance - Alert] : alert [General Appearance - In No Acute Distress] : in no acute distress [Extraocular Movements] : extraocular movements were intact [PERRL With Normal Accommodation] : pupils were equal in size, round, and reactive to light [Sclera] : the sclera and conjunctiva were normal [Oropharynx] : the oropharynx was normal [Neck Appearance] : the appearance of the neck was normal [Outer Ear] : the ears and nose were normal in appearance [Jugular Venous Distention Increased] : there was no jugular-venous distention [Neck Cervical Mass (___cm)] : no neck mass was observed [Thyroid Diffuse Enlargement] : the thyroid was not enlarged [Thyroid Nodule] : there were no palpable thyroid nodules [Heart Rate And Rhythm] : heart rate was normal and rhythm regular [Respiration, Rhythm And Depth] : normal respiratory rhythm and effort [Heart Sounds Gallop] : no gallops [Heart Sounds] : normal S1 and S2 [Heart Sounds Pericardial Friction Rub] : no pericardial rub [Full Pulse] : the pedal pulses are present [Murmurs] : no murmurs [Abdomen Soft] : soft [Bowel Sounds] : normal bowel sounds [Edema] : there was no peripheral edema [Abdomen Tenderness] : non-tender [Cervical Lymph Nodes Enlarged Posterior Bilaterally] : posterior cervical [Abdomen Mass (___ Cm)] : no abdominal mass palpated [Supraclavicular Lymph Nodes Enlarged Bilaterally] : supraclavicular [Cervical Lymph Nodes Enlarged Anterior Bilaterally] : anterior cervical [Axillary Lymph Nodes Enlarged Bilaterally] : axillary [Femoral Lymph Nodes Enlarged Bilaterally] : femoral [Inguinal Lymph Nodes Enlarged Bilaterally] : inguinal [No Spinal Tenderness] : no spinal tenderness [Abnormal Walk] : normal gait [No CVA Tenderness] : no ~M costovertebral angle tenderness [Skin Turgor] : normal skin turgor [Skin Color & Pigmentation] : normal skin color and pigmentation [] : no rash [Sensation] : the sensory exam was normal to light touch and pinprick [Deep Tendon Reflexes (DTR)] : deep tendon reflexes were 2+ and symmetric [Affect] : the affect was normal [No Focal Deficits] : no focal deficits [Mood] : the mood was normal [FreeTextEntry1] : Minimal arthritic changes of fingers.

## 2020-06-04 NOTE — REVIEW OF SYSTEMS
[Nocturia] : nocturia [Wheezing] : wheezing [As Noted in HPI] : as noted in HPI [Negative] : Psychiatric [FreeTextEntry6] : told of asthma and given symbicort or ventolin in the past [FreeTextEntry8] : Frequency sometimes as bad as 8x/night

## 2020-06-06 LAB
ALBUMIN SERPL ELPH-MCNC: 4.5 G/DL
ALP BLD-CCNC: 55 U/L
ALT SERPL-CCNC: 32 U/L
ANION GAP SERPL CALC-SCNC: 14 MMOL/L
AST SERPL-CCNC: 27 U/L
BASOPHILS # BLD AUTO: 0.03 K/UL
BASOPHILS NFR BLD AUTO: 0.4 %
BILIRUB SERPL-MCNC: 0.2 MG/DL
BUN SERPL-MCNC: 20 MG/DL
CALCIUM SERPL-MCNC: 9.4 MG/DL
CHLORIDE SERPL-SCNC: 105 MMOL/L
CO2 SERPL-SCNC: 22 MMOL/L
CREAT SERPL-MCNC: 0.79 MG/DL
CREAT SPEC-SCNC: 146 MG/DL
EOSINOPHIL # BLD AUTO: 0.49 K/UL
EOSINOPHIL NFR BLD AUTO: 6.2 %
GLUCOSE SERPL-MCNC: 95 MG/DL
HCT VFR BLD CALC: 42.1 %
HGB BLD-MCNC: 14.3 G/DL
IMM GRANULOCYTES NFR BLD AUTO: 0.6 %
LYMPHOCYTES # BLD AUTO: 2.72 K/UL
LYMPHOCYTES NFR BLD AUTO: 34.2 %
MAN DIFF?: NORMAL
MCHC RBC-ENTMCNC: 33.9 PG
MCHC RBC-ENTMCNC: 34 GM/DL
MCV RBC AUTO: 99.8 FL
MICROALBUMIN 24H UR DL<=1MG/L-MCNC: 93.7 MG/DL
MICROALBUMIN/CREAT 24H UR-RTO: 640 MG/G
MONOCYTES # BLD AUTO: 0.57 K/UL
MONOCYTES NFR BLD AUTO: 7.2 %
NEUTROPHILS # BLD AUTO: 4.09 K/UL
NEUTROPHILS NFR BLD AUTO: 51.4 %
PLATELET # BLD AUTO: 140 K/UL
POTASSIUM SERPL-SCNC: 4 MMOL/L
PROT SERPL-MCNC: 6.5 G/DL
RBC # BLD: 4.22 M/UL
RBC # FLD: 13.1 %
SARS-COV-2 IGG SERPL IA-ACNC: <0.1 INDEX
SARS-COV-2 IGG SERPL QL IA: NEGATIVE
SODIUM SERPL-SCNC: 142 MMOL/L
WBC # FLD AUTO: 7.95 K/UL

## 2020-06-08 LAB
DEPRECATED KAPPA LC FREE/LAMBDA SER: 0.97 RATIO
KAPPA LC CSF-MCNC: 1.1 MG/DL
KAPPA LC SERPL-MCNC: 1.07 MG/DL

## 2020-06-09 LAB
ALBUMIN MFR SERPL ELPH: 63.6 %
ALBUMIN SERPL-MCNC: 4.1 G/DL
ALBUMIN/GLOB SERPL: 1.7 RATIO
ALPHA1 GLOB MFR SERPL ELPH: 4 %
ALPHA1 GLOB SERPL ELPH-MCNC: 0.3 G/DL
ALPHA2 GLOB MFR SERPL ELPH: 11.6 %
ALPHA2 GLOB SERPL ELPH-MCNC: 0.8 G/DL
B-GLOBULIN MFR SERPL ELPH: 10.7 %
B-GLOBULIN SERPL ELPH-MCNC: 0.7 G/DL
DEPRECATED KAPPA LC FREE/LAMBDA SER: 0.97 RATIO
GAMMA GLOB FLD ELPH-MCNC: 0.7 G/DL
GAMMA GLOB MFR SERPL ELPH: 10.1 %
IGA SER QL IEP: 104 MG/DL
IGG SER QL IEP: 671 MG/DL
IGM SER QL IEP: 76 MG/DL
INTERPRETATION SERPL IEP-IMP: NORMAL
KAPPA LC CSF-MCNC: 1.1 MG/DL
KAPPA LC SERPL-MCNC: 1.07 MG/DL
M PROTEIN SPEC IFE-MCNC: NORMAL
PROT SERPL-MCNC: 6.5 G/DL
PROT SERPL-MCNC: 6.5 G/DL

## 2020-06-12 ENCOUNTER — APPOINTMENT (OUTPATIENT)
Dept: ULTRASOUND IMAGING | Facility: CLINIC | Age: 70
End: 2020-06-12
Payer: COMMERCIAL

## 2020-06-12 ENCOUNTER — APPOINTMENT (OUTPATIENT)
Dept: RADIOLOGY | Facility: CLINIC | Age: 70
End: 2020-06-12
Payer: COMMERCIAL

## 2020-06-12 ENCOUNTER — OUTPATIENT (OUTPATIENT)
Dept: OUTPATIENT SERVICES | Facility: HOSPITAL | Age: 70
LOS: 1 days | End: 2020-06-12
Payer: COMMERCIAL

## 2020-06-12 DIAGNOSIS — R80.9 PROTEINURIA, UNSPECIFIED: ICD-10-CM

## 2020-06-12 DIAGNOSIS — Z98.890 OTHER SPECIFIED POSTPROCEDURAL STATES: Chronic | ICD-10-CM

## 2020-06-12 DIAGNOSIS — M06.9 RHEUMATOID ARTHRITIS, UNSPECIFIED: ICD-10-CM

## 2020-06-12 DIAGNOSIS — Z00.8 ENCOUNTER FOR OTHER GENERAL EXAMINATION: ICD-10-CM

## 2020-06-12 DIAGNOSIS — Z90.89 ACQUIRED ABSENCE OF OTHER ORGANS: Chronic | ICD-10-CM

## 2020-06-12 PROCEDURE — 71046 X-RAY EXAM CHEST 2 VIEWS: CPT | Mod: 26

## 2020-06-12 PROCEDURE — 76857 US EXAM PELVIC LIMITED: CPT | Mod: 26

## 2020-06-12 PROCEDURE — 76700 US EXAM ABDOM COMPLETE: CPT | Mod: 26,59

## 2020-06-12 PROCEDURE — 76857 US EXAM PELVIC LIMITED: CPT

## 2020-06-12 PROCEDURE — 71046 X-RAY EXAM CHEST 2 VIEWS: CPT

## 2020-06-12 PROCEDURE — 93975 VASCULAR STUDY: CPT

## 2020-06-12 PROCEDURE — 93975 VASCULAR STUDY: CPT | Mod: 26,59

## 2020-06-12 PROCEDURE — 76700 US EXAM ABDOM COMPLETE: CPT

## 2020-06-13 ENCOUNTER — APPOINTMENT (OUTPATIENT)
Dept: RADIOLOGY | Facility: HOSPITAL | Age: 70
End: 2020-06-13

## 2020-06-13 ENCOUNTER — APPOINTMENT (OUTPATIENT)
Dept: ULTRASOUND IMAGING | Facility: HOSPITAL | Age: 70
End: 2020-06-13

## 2020-06-16 ENCOUNTER — NON-APPOINTMENT (OUTPATIENT)
Age: 70
End: 2020-06-16

## 2020-06-16 ENCOUNTER — APPOINTMENT (OUTPATIENT)
Dept: INTERNAL MEDICINE | Facility: CLINIC | Age: 70
End: 2020-06-16
Payer: COMMERCIAL

## 2020-06-16 VITALS — HEIGHT: 69 IN | WEIGHT: 170 LBS | BODY MASS INDEX: 25.18 KG/M2

## 2020-06-16 PROCEDURE — 99214 OFFICE O/P EST MOD 30 MIN: CPT | Mod: 25

## 2020-06-16 PROCEDURE — 93000 ELECTROCARDIOGRAM COMPLETE: CPT

## 2020-06-16 NOTE — PHYSICAL EXAM
[General Appearance - In No Acute Distress] : in no acute distress [Both Tympanic Membranes Were Examined] : both tympanic membranes were normal [Respiration, Rhythm And Depth] : normal respiratory rhythm and effort [Auscultation Breath Sounds / Voice Sounds] : lungs were clear to auscultation bilaterally [Lungs Percussion] : the lungs were normal to percussion [Apical Impulse] : the apical impulse was normal [Heart Sounds Gallop] : no gallops [Murmurs] : no murmurs [Edema] : there was no peripheral edema [Affect] : the affect was normal [Memory Recent] : recent memory was not impaired

## 2020-07-04 NOTE — ASU PATIENT PROFILE, ADULT - NS PREOP UNDERSTANDS INFO
yes
CONSTITUTIONAL: obese male in significant distress  HEAD: Normocephalic; atraumatic.   EYES:  conjunctiva and sclera clear  ENT: normal nose; no rhinorrhea; normal pharynx with no erythema or lesions.   NECK: Supple; non-tender;   CARDIOVASCULAR: Normal S1, S2; no murmurs, rubs, or gallops. Regular rate and rhythm.   RESPIRATORY: Breathing easily; breath sounds clear and equal bilaterally; no wheezes, rhonchi, or rales.  GI: Soft; distended, moderate diffuse tenderness to palpation, no peritoneal signs  EXT: No cyanosis or edema; N/V intact  SKIN: Normal for age and race; warm; dry; good turgor; no apparent lesions or rash.   NEURO: A & O x 3; face symmetric; grossly unremarkable.   PSYCHOLOGICAL: The patient’s mood and manner are appropriate.

## 2020-07-24 ENCOUNTER — APPOINTMENT (OUTPATIENT)
Dept: GASTROENTEROLOGY | Facility: HOSPITAL | Age: 70
End: 2020-07-24

## 2020-07-31 NOTE — H&P PST ADULT - CARDIOVASCULAR
Nutrition Care Plan    Nutrition Diagnosis:   Increased nutrient needs  related to multiple wounds as evidenced by estimated nutrition needs to promote wound healing.    Intervention:  General/healthful diet:   recommend continue with current renal diet, encourage intakes  Commercial beverage:   renal supplement once daily  Other:  estimated needs:  7964-5476 calories/day, 110-120 grams protein/day    Monitoring and Evaluation:  Amount of food:   Goal is for patient to tolerate diet and supplements, consume >/=75%  Skin:   show signs of wound healing     negative detailed exam

## 2020-08-25 ENCOUNTER — RX RENEWAL (OUTPATIENT)
Age: 70
End: 2020-08-25

## 2020-11-04 NOTE — ASU PATIENT PROFILE, ADULT - NSSUBSTANCEUSE_GEN_ALL_CORE_SD
-- DO NOT REPLY / DO NOT REPLY ALL --  -- Message is from the [facility]--    COVID-19 Universal Screening: N/A - Not about scheduling    General Patient Message      Reason for Call: The patient's wife called - she received an automated message to call and make an appointment with his PCP. She wants to not get any more calls like that as he is terminal and bedridden. Thank you.     Caller Information       Type Contact Phone    11/04/2020 12:38 PM CST Phone (Incoming) ALENA MEJIA (Emergency Contact) [phone]          Alternative phone number: na    Turnaround time given to caller:   \"This message will be sent to [state Provider's name]. The clinical team will fulfill your request as soon as they review your message.\"    
caffeine

## 2020-12-28 ENCOUNTER — RX RENEWAL (OUTPATIENT)
Age: 70
End: 2020-12-28

## 2020-12-29 ENCOUNTER — RX RENEWAL (OUTPATIENT)
Age: 70
End: 2020-12-29

## 2020-12-30 ENCOUNTER — TRANSCRIPTION ENCOUNTER (OUTPATIENT)
Age: 70
End: 2020-12-30

## 2020-12-30 RX ORDER — ALBUTEROL SULFATE 90 UG/1
108 (90 BASE) AEROSOL, METERED RESPIRATORY (INHALATION)
Qty: 1 | Refills: 5 | Status: ACTIVE | COMMUNITY
Start: 2017-09-18 | End: 1900-01-01

## 2021-01-27 ENCOUNTER — RX RENEWAL (OUTPATIENT)
Age: 71
End: 2021-01-27

## 2021-03-29 ENCOUNTER — RX RENEWAL (OUTPATIENT)
Age: 71
End: 2021-03-29

## 2021-05-03 ENCOUNTER — RX RENEWAL (OUTPATIENT)
Age: 71
End: 2021-05-03

## 2021-05-24 ENCOUNTER — APPOINTMENT (OUTPATIENT)
Dept: INTERNAL MEDICINE | Facility: CLINIC | Age: 71
End: 2021-05-24
Payer: COMMERCIAL

## 2021-05-24 VITALS — TEMPERATURE: 96.6 F

## 2021-05-24 VITALS — SYSTOLIC BLOOD PRESSURE: 132 MMHG | DIASTOLIC BLOOD PRESSURE: 84 MMHG

## 2021-05-24 DIAGNOSIS — G47.00 INSOMNIA, UNSPECIFIED: ICD-10-CM

## 2021-05-24 PROCEDURE — 99214 OFFICE O/P EST MOD 30 MIN: CPT

## 2021-05-24 PROCEDURE — 99072 ADDL SUPL MATRL&STAF TM PHE: CPT

## 2021-05-24 NOTE — HISTORY OF PRESENT ILLNESS
[FreeTextEntry1] : "I need a renewal on Ambien" [de-identified] : MILTON CHANG is a 71 year old male with history of Insomnia, Hypertension, Proteinuria who presents for medical follow-up, with above request. 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 He is also a  (hobby, not vocation). He admits without Ambien he has great difficulty going to sleep. He denies any history of drug or alcohol abuse. \par Has changed diet since last visit--cut down on processed foods, eating leaner meats and greatly increased intake of vegetables. Admits that he "loves" salt and believes he will not be able to cut down futher on salt intake.

## 2021-05-24 NOTE — PLAN
[FreeTextEntry1] : Zolpidem renewed x 30d with 1 refill after discussing at length on risks and benefits of med. Strongly advised him not to take medication within 12 hours of any planned driving of any vehicles (cars, trucks or plane) and encouraged him to start cutting down on med. Also advised him to have f/u with Dr. Guillen regularly to have regular f/u discussion on med and ensure safe prescribing of med. \par BP is much improved from prior--continue present meds, advised to cut down further on salt intake. \par h/o Proteinuria: repeat UA today.\par F.u Dr Guillen 2 months

## 2021-05-24 NOTE — PHYSICAL EXAM
[No JVD] : no jugular venous distention [No Carotid Bruits] : no carotid bruits [No Edema] : there was no peripheral edema [No Rash] : no rash [Normal] : affect was normal and insight and judgment were intact

## 2021-05-25 ENCOUNTER — TRANSCRIPTION ENCOUNTER (OUTPATIENT)
Age: 71
End: 2021-05-25

## 2021-05-25 LAB
APPEARANCE: CLEAR
BILIRUBIN URINE: NEGATIVE
BLOOD URINE: NEGATIVE
COLOR: YELLOW
GLUCOSE QUALITATIVE U: NEGATIVE
KETONES URINE: NEGATIVE
LEUKOCYTE ESTERASE URINE: NEGATIVE
NITRITE URINE: NEGATIVE
PH URINE: 5.5
PROTEIN URINE: NORMAL
SPECIFIC GRAVITY URINE: 1.03
UROBILINOGEN URINE: NORMAL

## 2021-08-06 ENCOUNTER — APPOINTMENT (OUTPATIENT)
Dept: INTERNAL MEDICINE | Facility: CLINIC | Age: 71
End: 2021-08-06
Payer: COMMERCIAL

## 2021-08-06 ENCOUNTER — LABORATORY RESULT (OUTPATIENT)
Age: 71
End: 2021-08-06

## 2021-08-06 VITALS
DIASTOLIC BLOOD PRESSURE: 70 MMHG | BODY MASS INDEX: 25.62 KG/M2 | OXYGEN SATURATION: 98 % | WEIGHT: 173 LBS | HEIGHT: 69 IN | SYSTOLIC BLOOD PRESSURE: 120 MMHG

## 2021-08-06 DIAGNOSIS — L03.031 CELLULITIS OF RIGHT TOE: ICD-10-CM

## 2021-08-06 DIAGNOSIS — N40.0 BENIGN PROSTATIC HYPERPLASIA WITHOUT LOWER URINARY TRACT SYMPMS: ICD-10-CM

## 2021-08-06 PROCEDURE — 36415 COLL VENOUS BLD VENIPUNCTURE: CPT

## 2021-08-06 PROCEDURE — 99214 OFFICE O/P EST MOD 30 MIN: CPT | Mod: 25

## 2021-08-06 RX ORDER — LISINOPRIL 10 MG/1
10 TABLET ORAL
Qty: 90 | Refills: 3 | Status: DISCONTINUED | COMMUNITY
Start: 2020-06-16 | End: 2021-08-06

## 2021-08-07 LAB
ALBUMIN SERPL ELPH-MCNC: 4.3 G/DL
ALP BLD-CCNC: 67 U/L
ALT SERPL-CCNC: 20 U/L
ANION GAP SERPL CALC-SCNC: 17 MMOL/L
AST SERPL-CCNC: 20 U/L
BILIRUB SERPL-MCNC: 0.4 MG/DL
BUN SERPL-MCNC: 12 MG/DL
CALCIUM SERPL-MCNC: 9.4 MG/DL
CHLORIDE SERPL-SCNC: 107 MMOL/L
CHOLEST SERPL-MCNC: 114 MG/DL
CO2 SERPL-SCNC: 21 MMOL/L
CREAT SERPL-MCNC: 0.79 MG/DL
ESTIMATED AVERAGE GLUCOSE: 111 MG/DL
GLUCOSE SERPL-MCNC: 133 MG/DL
HBA1C MFR BLD HPLC: 5.5 %
HDLC SERPL-MCNC: 46 MG/DL
LDLC SERPL CALC-MCNC: 48 MG/DL
NONHDLC SERPL-MCNC: 68 MG/DL
POTASSIUM SERPL-SCNC: 4.7 MMOL/L
PROT SERPL-MCNC: 6.7 G/DL
PSA SERPL-MCNC: 0.7 NG/ML
SODIUM SERPL-SCNC: 145 MMOL/L
TRIGL SERPL-MCNC: 100 MG/DL
TSH SERPL-ACNC: 0.64 UIU/ML

## 2021-08-17 ENCOUNTER — NON-APPOINTMENT (OUTPATIENT)
Age: 71
End: 2021-08-17

## 2021-08-17 LAB
APPEARANCE: CLEAR
BILIRUBIN URINE: NEGATIVE
BLOOD URINE: NEGATIVE
COLOR: YELLOW
CREAT SPEC-SCNC: 317 MG/DL
GLUCOSE QUALITATIVE U: NEGATIVE
KETONES URINE: NEGATIVE
LEUKOCYTE ESTERASE URINE: NEGATIVE
MICROALBUMIN 24H UR DL<=1MG/L-MCNC: 109.1 MG/DL
MICROALBUMIN/CREAT 24H UR-RTO: 344 MG/G
NITRITE URINE: NEGATIVE
PH URINE: 6
PROTEIN URINE: ABNORMAL
SPECIFIC GRAVITY URINE: 1.04
UROBILINOGEN URINE: NORMAL

## 2021-08-18 LAB
BASOPHILS # BLD AUTO: 0 K/UL
BASOPHILS NFR BLD AUTO: 0 %
CREAT SPEC-SCNC: 317 MG/DL
CREAT/PROT UR: 0.6 RATIO
EOSINOPHIL # BLD AUTO: 0 K/UL
EOSINOPHIL NFR BLD AUTO: 0 %
HCT VFR BLD CALC: 39.5 %
HGB BLD-MCNC: 13.4 G/DL
LYMPHOCYTES # BLD AUTO: 1.3 K/UL
LYMPHOCYTES NFR BLD AUTO: 25.2 %
MAN DIFF?: NORMAL
MCHC RBC-ENTMCNC: 32.7 PG
MCHC RBC-ENTMCNC: 33.9 GM/DL
MCV RBC AUTO: 96.3 FL
MONOCYTES # BLD AUTO: 0.45 K/UL
MONOCYTES NFR BLD AUTO: 8.7 %
NEUTROPHILS # BLD AUTO: 3.24 K/UL
NEUTROPHILS NFR BLD AUTO: 61.7 %
PLATELET # BLD AUTO: 115 K/UL
PROT UR-MCNC: 199 MG/DL
RBC # BLD: 4.1 M/UL
RBC # FLD: 12.7 %
WBC # FLD AUTO: 5.17 K/UL

## 2021-12-20 ENCOUNTER — RX RENEWAL (OUTPATIENT)
Age: 71
End: 2021-12-20

## 2022-01-25 ENCOUNTER — TRANSCRIPTION ENCOUNTER (OUTPATIENT)
Age: 72
End: 2022-01-25

## 2022-02-24 ENCOUNTER — RX RENEWAL (OUTPATIENT)
Age: 72
End: 2022-02-24

## 2022-03-03 ENCOUNTER — APPOINTMENT (OUTPATIENT)
Dept: INTERNAL MEDICINE | Facility: CLINIC | Age: 72
End: 2022-03-03

## 2022-04-14 ENCOUNTER — APPOINTMENT (OUTPATIENT)
Dept: INTERNAL MEDICINE | Facility: CLINIC | Age: 72
End: 2022-04-14
Payer: MEDICARE

## 2022-04-14 VITALS
RESPIRATION RATE: 12 BRPM | DIASTOLIC BLOOD PRESSURE: 80 MMHG | SYSTOLIC BLOOD PRESSURE: 140 MMHG | OXYGEN SATURATION: 97 % | HEART RATE: 70 BPM | TEMPERATURE: 97.4 F

## 2022-04-14 VITALS — HEIGHT: 69 IN | BODY MASS INDEX: 25.18 KG/M2 | WEIGHT: 170 LBS

## 2022-04-14 PROCEDURE — 36415 COLL VENOUS BLD VENIPUNCTURE: CPT

## 2022-04-14 PROCEDURE — 99213 OFFICE O/P EST LOW 20 MIN: CPT | Mod: 25

## 2022-04-14 RX ORDER — TAMSULOSIN HYDROCHLORIDE 0.4 MG/1
0.4 CAPSULE ORAL
Qty: 30 | Refills: 2 | Status: COMPLETED | COMMUNITY
Start: 2018-06-20 | End: 2022-04-14

## 2022-04-14 NOTE — PLAN
[FreeTextEntry1] : #Insomnia: ISTOP checked, Ref# [id number]; also fills chronic narcotic pain meds wih Dr. Avram Goldberg. Ambien renewed.\par #Hypertension, proteinuria: previously on Lisinopril then stopped, not interested in meds. Aware of risks of progressive proteinuria/CKD. Low salt diet reviewed. Patient was unable to provide urine sample today for UA.\par #Thrombocytopenia: no signs/symptoms of bleed, check CBC to monitor--drawn today.\par F/u 6m/prn.

## 2022-04-14 NOTE — HISTORY OF PRESENT ILLNESS
[FreeTextEntry1] : followup, med renewal [de-identified] : MILTON CHANG is a 72 year old male with history of Hypertension, Proteinuria, Chronic insomnia, requesting renewal of Zolpidem. He denies any complaints today, no new updates since last visit. Has chronic pain, h/o RA, managed with narcotic pain meds from rheumatologist.\par Not on antihypertensives, on low salt diet. Denies any chest pains or breathing difficulties.\par He has not received COVID19 vaccines and declined to discuss further on this.

## 2022-04-14 NOTE — PHYSICAL EXAM
[Normal Oropharynx] : the oropharynx was normal [No JVD] : no jugular venous distention [Normal] : normal rate, regular rhythm, normal S1 and S2 and no murmur heard [Coordination Grossly Intact] : coordination grossly intact [No Focal Deficits] : no focal deficits [Normal Gait] : normal gait [Normal Affect] : the affect was normal [Normal Insight/Judgement] : insight and judgment were intact

## 2022-04-28 LAB
BASOPHILS # BLD AUTO: 0.03 K/UL
BASOPHILS NFR BLD AUTO: 0.4 %
EOSINOPHIL # BLD AUTO: 0.23 K/UL
EOSINOPHIL NFR BLD AUTO: 3.2 %
HCT VFR BLD CALC: 43.6 %
HGB BLD-MCNC: 15.2 G/DL
IMM GRANULOCYTES NFR BLD AUTO: 0.8 %
LYMPHOCYTES # BLD AUTO: 3.06 K/UL
LYMPHOCYTES NFR BLD AUTO: 42.9 %
MAN DIFF?: NORMAL
MCHC RBC-ENTMCNC: 33.5 PG
MCHC RBC-ENTMCNC: 34.9 GM/DL
MCV RBC AUTO: 96 FL
MONOCYTES # BLD AUTO: 0.53 K/UL
MONOCYTES NFR BLD AUTO: 7.4 %
NEUTROPHILS # BLD AUTO: 3.23 K/UL
NEUTROPHILS NFR BLD AUTO: 45.3 %
PLATELET # BLD AUTO: 125 K/UL
RBC # BLD: 4.54 M/UL
RBC # FLD: 12.6 %
WBC # FLD AUTO: 7.14 K/UL

## 2022-05-05 ENCOUNTER — LABORATORY RESULT (OUTPATIENT)
Age: 72
End: 2022-05-05

## 2022-08-12 NOTE — ASU PREOP CHECKLIST - IDENTIFICATION BAND VERIFIED
Patient calls have been made to schedule patient for a service to cardiology with no success. Attempts will no longer be made.    done

## 2022-10-03 ENCOUNTER — APPOINTMENT (OUTPATIENT)
Dept: UROLOGY | Facility: CLINIC | Age: 72
End: 2022-10-03

## 2023-02-26 ENCOUNTER — NON-APPOINTMENT (OUTPATIENT)
Age: 73
End: 2023-02-26

## 2023-02-27 ENCOUNTER — APPOINTMENT (OUTPATIENT)
Dept: INTERNAL MEDICINE | Facility: CLINIC | Age: 73
End: 2023-02-27
Payer: COMMERCIAL

## 2023-02-27 VITALS
SYSTOLIC BLOOD PRESSURE: 140 MMHG | DIASTOLIC BLOOD PRESSURE: 80 MMHG | OXYGEN SATURATION: 98 % | RESPIRATION RATE: 14 BRPM | HEART RATE: 95 BPM

## 2023-02-27 DIAGNOSIS — G47.00 INSOMNIA, UNSPECIFIED: ICD-10-CM

## 2023-02-27 PROCEDURE — 99213 OFFICE O/P EST LOW 20 MIN: CPT | Mod: 25

## 2023-02-27 NOTE — PLAN
[FreeTextEntry1] : #Insomnia: ISTOP checked, Reference #: [id number], also fills chronic narcotic pain meds with Dr. Avram Goldberg. Ambien renewed.\par #Hypertension, proteinuria: previously on Lisinopril then stopped, not interested in meds. Aware of risks of progressive proteinuria/CKD. Low salt diet reviewed. Patient was unable to provide urine sample today for UA.\par #Thrombocytopenia: no signs/symptoms of bleed\par Advised patient he NEEDs to schedule appointment for CPE and labs within the next 3 months--if no CPE completed prior to end of May 2023 (giving him 3 months to schedule due to his work commitments, does not know his schedule), cannot renew any further meds for him for his safety..

## 2023-02-27 NOTE — HISTORY OF PRESENT ILLNESS
[FreeTextEntry1] : followup, med renewal [de-identified] : MILTON CHANG is a 73 year old male with history of Hypertension, Proteinuria, Chronic insomnia, requesting renewal of Zolpidem. He denies any complaints today, no new updates since last visit. Has chronic pain, h/o RA, managed with narcotic pain meds from rheumatologist.\par Needs CPE.\par Not on antihypertensives, on low salt diet. Denies any chest pains or breathing difficulties.\par He has not received COVID19 vaccines.

## 2023-03-13 ENCOUNTER — NON-APPOINTMENT (OUTPATIENT)
Age: 73
End: 2023-03-13

## 2023-05-17 ENCOUNTER — RX RENEWAL (OUTPATIENT)
Age: 73
End: 2023-05-17

## 2023-07-24 NOTE — ASU PATIENT PROFILE, ADULT - NS PRO AD PATIENT TYPE
Called pt; spoke to mother who stated that [place] is [place] of [place] and will be calling office to make an appt soon.    Health Care Proxy (HCP)

## 2023-11-01 ENCOUNTER — NON-APPOINTMENT (OUTPATIENT)
Age: 73
End: 2023-11-01

## 2023-12-05 ENCOUNTER — NON-APPOINTMENT (OUTPATIENT)
Age: 73
End: 2023-12-05

## 2023-12-06 ENCOUNTER — LABORATORY RESULT (OUTPATIENT)
Age: 73
End: 2023-12-06

## 2023-12-06 ENCOUNTER — NON-APPOINTMENT (OUTPATIENT)
Age: 73
End: 2023-12-06

## 2023-12-06 ENCOUNTER — APPOINTMENT (OUTPATIENT)
Dept: INTERNAL MEDICINE | Facility: CLINIC | Age: 73
End: 2023-12-06
Payer: COMMERCIAL

## 2023-12-06 VITALS
HEART RATE: 68 BPM | TEMPERATURE: 98.1 F | SYSTOLIC BLOOD PRESSURE: 170 MMHG | OXYGEN SATURATION: 97 % | DIASTOLIC BLOOD PRESSURE: 80 MMHG | RESPIRATION RATE: 14 BRPM

## 2023-12-06 VITALS — BODY MASS INDEX: 24.34 KG/M2 | WEIGHT: 170 LBS | HEIGHT: 70 IN

## 2023-12-06 DIAGNOSIS — Z87.39 PERSONAL HISTORY OF OTHER DISEASES OF THE MUSCULOSKELETAL SYSTEM AND CONNECTIVE TISSUE: ICD-10-CM

## 2023-12-06 DIAGNOSIS — Z00.00 ENCOUNTER FOR GENERAL ADULT MEDICAL EXAMINATION W/OUT ABNORMAL FINDINGS: ICD-10-CM

## 2023-12-06 DIAGNOSIS — S52.552P OTHER EXTRAARTICULAR FRACTURE OF LOWER END OF LEFT RADIUS, SUBSEQUENT ENCOUNTER FOR CLOSED FRACTURE WITH MALUNION: ICD-10-CM

## 2023-12-06 DIAGNOSIS — S66.812D STRAIN OF OTHER SPECIFIED MUSCLES, FASCIA AND TENDONS AT WRIST AND HAND LEVEL, LEFT HAND, SUBSEQUENT ENCOUNTER: ICD-10-CM

## 2023-12-06 DIAGNOSIS — Z87.2 PERSONAL HISTORY OF DISEASES OF THE SKIN AND SUBCUTANEOUS TISSUE: ICD-10-CM

## 2023-12-06 DIAGNOSIS — Z87.81 PERSONAL HISTORY OF (HEALED) TRAUMATIC FRACTURE: ICD-10-CM

## 2023-12-06 DIAGNOSIS — R25.2 CRAMP AND SPASM: ICD-10-CM

## 2023-12-06 DIAGNOSIS — D69.6 THROMBOCYTOPENIA, UNSPECIFIED: ICD-10-CM

## 2023-12-06 DIAGNOSIS — Z98.890 OTHER SPECIFIED POSTPROCEDURAL STATES: ICD-10-CM

## 2023-12-06 DIAGNOSIS — S66.812A STRAIN OF OTHER SPECIFIED MUSCLES, FASCIA AND TENDONS AT WRIST AND HAND LEVEL, LEFT HAND, INITIAL ENCOUNTER: ICD-10-CM

## 2023-12-06 DIAGNOSIS — D69.2 OTHER NONTHROMBOCYTOPENIC PURPURA: ICD-10-CM

## 2023-12-06 DIAGNOSIS — F11.20 OPIOID DEPENDENCE, UNCOMPLICATED: ICD-10-CM

## 2023-12-06 DIAGNOSIS — M06.9 RHEUMATOID ARTHRITIS, UNSPECIFIED: ICD-10-CM

## 2023-12-06 DIAGNOSIS — Z87.81 OTHER SPECIFIED POSTPROCEDURAL STATES: ICD-10-CM

## 2023-12-06 DIAGNOSIS — R35.1 NOCTURIA: ICD-10-CM

## 2023-12-06 DIAGNOSIS — Z12.83 ENCOUNTER FOR SCREENING FOR MALIGNANT NEOPLASM OF SKIN: ICD-10-CM

## 2023-12-06 DIAGNOSIS — R33.9 RETENTION OF URINE, UNSPECIFIED: ICD-10-CM

## 2023-12-06 DIAGNOSIS — S52.552S OTHER EXTRAARTICULAR FRACTURE OF LOWER END OF LEFT RADIUS, SEQUELA: ICD-10-CM

## 2023-12-06 DIAGNOSIS — J45.909 UNSPECIFIED ASTHMA, UNCOMPLICATED: ICD-10-CM

## 2023-12-06 DIAGNOSIS — L81.0 POSTINFLAMMATORY HYPERPIGMENTATION: ICD-10-CM

## 2023-12-06 PROCEDURE — 36415 COLL VENOUS BLD VENIPUNCTURE: CPT

## 2023-12-06 PROCEDURE — 99213 OFFICE O/P EST LOW 20 MIN: CPT | Mod: 25

## 2023-12-06 PROCEDURE — 93000 ELECTROCARDIOGRAM COMPLETE: CPT

## 2023-12-06 PROCEDURE — G0439: CPT

## 2023-12-08 ENCOUNTER — RX CHANGE (OUTPATIENT)
Age: 73
End: 2023-12-08

## 2023-12-08 ENCOUNTER — NON-APPOINTMENT (OUTPATIENT)
Age: 73
End: 2023-12-08

## 2023-12-08 RX ORDER — FLUTICASONE PROPIONATE 100 UG/1
100 POWDER, METERED RESPIRATORY (INHALATION) TWICE DAILY
Qty: 1 | Refills: 3 | Status: ACTIVE | COMMUNITY
Start: 2023-12-06 | End: 1900-01-01

## 2023-12-12 ENCOUNTER — APPOINTMENT (OUTPATIENT)
Dept: PULMONOLOGY | Facility: CLINIC | Age: 73
End: 2023-12-12
Payer: COMMERCIAL

## 2023-12-12 VITALS
OXYGEN SATURATION: 96 % | HEART RATE: 74 BPM | SYSTOLIC BLOOD PRESSURE: 149 MMHG | WEIGHT: 159 LBS | HEIGHT: 70 IN | DIASTOLIC BLOOD PRESSURE: 65 MMHG | BODY MASS INDEX: 22.76 KG/M2

## 2023-12-12 DIAGNOSIS — R06.02 SHORTNESS OF BREATH: ICD-10-CM

## 2023-12-12 LAB — POCT - HEMOGLOBIN (HGB), QUANTITATIVE, TRANSCUTANEOUS: 15.7

## 2023-12-12 PROCEDURE — 94726 PLETHYSMOGRAPHY LUNG VOLUMES: CPT

## 2023-12-12 PROCEDURE — 95012 NITRIC OXIDE EXP GAS DETER: CPT

## 2023-12-12 PROCEDURE — 94060 EVALUATION OF WHEEZING: CPT

## 2023-12-12 PROCEDURE — 99203 OFFICE O/P NEW LOW 30 MIN: CPT | Mod: 25

## 2023-12-12 PROCEDURE — 71046 X-RAY EXAM CHEST 2 VIEWS: CPT

## 2023-12-12 RX ORDER — FLUTICASONE FUROATE, UMECLIDINIUM BROMIDE AND VILANTEROL TRIFENATATE 100; 62.5; 25 UG/1; UG/1; UG/1
100-62.5-25 POWDER RESPIRATORY (INHALATION)
Qty: 90 | Refills: 3 | Status: ACTIVE | COMMUNITY
Start: 2023-12-12 | End: 1900-01-01

## 2023-12-15 ENCOUNTER — OUTPATIENT (OUTPATIENT)
Dept: OUTPATIENT SERVICES | Facility: HOSPITAL | Age: 73
LOS: 1 days | End: 2023-12-15
Payer: COMMERCIAL

## 2023-12-15 ENCOUNTER — APPOINTMENT (OUTPATIENT)
Dept: CT IMAGING | Facility: HOSPITAL | Age: 73
End: 2023-12-15
Payer: COMMERCIAL

## 2023-12-15 DIAGNOSIS — Z98.890 OTHER SPECIFIED POSTPROCEDURAL STATES: Chronic | ICD-10-CM

## 2023-12-15 DIAGNOSIS — Z90.89 ACQUIRED ABSENCE OF OTHER ORGANS: Chronic | ICD-10-CM

## 2023-12-15 DIAGNOSIS — J44.89 OTHER SPECIFIED CHRONIC OBSTRUCTIVE PULMONARY DISEASE: ICD-10-CM

## 2023-12-15 DIAGNOSIS — M06.9 RHEUMATOID ARTHRITIS, UNSPECIFIED: ICD-10-CM

## 2023-12-15 PROCEDURE — 71250 CT THORAX DX C-: CPT

## 2023-12-15 PROCEDURE — 71250 CT THORAX DX C-: CPT | Mod: 26

## 2023-12-19 ENCOUNTER — APPOINTMENT (OUTPATIENT)
Dept: INTERNAL MEDICINE | Facility: CLINIC | Age: 73
End: 2023-12-19
Payer: COMMERCIAL

## 2023-12-19 VITALS
RESPIRATION RATE: 14 BRPM | SYSTOLIC BLOOD PRESSURE: 138 MMHG | DIASTOLIC BLOOD PRESSURE: 70 MMHG | HEART RATE: 65 BPM | OXYGEN SATURATION: 97 %

## 2023-12-19 DIAGNOSIS — R80.9 PROTEINURIA, UNSPECIFIED: ICD-10-CM

## 2023-12-19 DIAGNOSIS — I10 ESSENTIAL (PRIMARY) HYPERTENSION: ICD-10-CM

## 2023-12-19 LAB
25(OH)D3 SERPL-MCNC: 58.9 NG/ML
ALBUMIN SERPL ELPH-MCNC: 4.3 G/DL
ALP BLD-CCNC: 76 U/L
ALT SERPL-CCNC: 10 U/L
ANION GAP SERPL CALC-SCNC: 12 MMOL/L
APPEARANCE: CLEAR
AST SERPL-CCNC: 17 U/L
BASOPHILS # BLD AUTO: 0.05 K/UL
BASOPHILS NFR BLD AUTO: 0.6 %
BILIRUB SERPL-MCNC: 0.5 MG/DL
BILIRUBIN URINE: NEGATIVE
BLOOD URINE: NEGATIVE
BUN SERPL-MCNC: 13 MG/DL
CALCIUM SERPL-MCNC: 9.9 MG/DL
CHLORIDE SERPL-SCNC: 103 MMOL/L
CHOLEST SERPL-MCNC: 122 MG/DL
CO2 SERPL-SCNC: 27 MMOL/L
COLOR: YELLOW
CREAT SERPL-MCNC: 0.76 MG/DL
CREAT SPEC-SCNC: 155 MG/DL
CREAT/PROT UR: 0.7 RATIO
EGFR: 96 ML/MIN/1.73M2
EOSINOPHIL # BLD AUTO: 0.39 K/UL
EOSINOPHIL NFR BLD AUTO: 4.3 %
ESTIMATED AVERAGE GLUCOSE: 103 MG/DL
GLUCOSE QUALITATIVE U: NEGATIVE MG/DL
GLUCOSE SERPL-MCNC: 89 MG/DL
HBA1C MFR BLD HPLC: 5.2 %
HCT VFR BLD CALC: 44.4 %
HDLC SERPL-MCNC: 60 MG/DL
HGB BLD-MCNC: 14.8 G/DL
IMM GRANULOCYTES NFR BLD AUTO: 0.7 %
KETONES URINE: NEGATIVE MG/DL
LDLC SERPL CALC-MCNC: 27 MG/DL
LEUKOCYTE ESTERASE URINE: NEGATIVE
LYMPHOCYTES # BLD AUTO: 2.97 K/UL
LYMPHOCYTES NFR BLD AUTO: 33.1 %
MAN DIFF?: NORMAL
MCHC RBC-ENTMCNC: 31.2 PG
MCHC RBC-ENTMCNC: 33.3 GM/DL
MCV RBC AUTO: 93.5 FL
MONOCYTES # BLD AUTO: 0.68 K/UL
MONOCYTES NFR BLD AUTO: 7.6 %
NEUTROPHILS # BLD AUTO: 4.83 K/UL
NEUTROPHILS NFR BLD AUTO: 53.7 %
NITRITE URINE: NEGATIVE
NONHDLC SERPL-MCNC: 62 MG/DL
PH URINE: 5.5
PLATELET # BLD AUTO: 168 K/UL
POTASSIUM SERPL-SCNC: 3.9 MMOL/L
PROT SERPL-MCNC: 6.9 G/DL
PROT UR-MCNC: 104 MG/DL
PROTEIN URINE: 100 MG/DL
PSA SERPL-MCNC: 1.16 NG/ML
RBC # BLD: 4.75 M/UL
RBC # FLD: 14 %
SODIUM SERPL-SCNC: 142 MMOL/L
SPECIFIC GRAVITY URINE: 1.03
TRIGL SERPL-MCNC: 234 MG/DL
TSH SERPL-ACNC: 1.52 UIU/ML
UROBILINOGEN URINE: 0.2 MG/DL
WBC # FLD AUTO: 8.98 K/UL

## 2023-12-19 PROCEDURE — 99213 OFFICE O/P EST LOW 20 MIN: CPT

## 2023-12-19 NOTE — HISTORY OF PRESENT ILLNESS
[FreeTextEntry1] : follow-up blood pressure, proteinuria, cough/wheezing [de-identified] : MILTON CHANG is a 73 year old male who presents for medical follow-up. 2 weeks ago BP noted to be elevated, initiated on Lisinopril 20 mg. +Proteinuria.  Wheezing/cough, initiated on Fluticasone inhaler last visit, saw pulmonologist who switched him to Trelegy. Overall feeling better.

## 2023-12-19 NOTE — PHYSICAL EXAM
[No JVD] : no jugular venous distention [Normal] : normal rate, regular rhythm, normal S1 and S2 and no murmur heard [Normal Gait] : normal gait [Normal Affect] : the affect was normal [Normal Insight/Judgement] : insight and judgment were intact

## 2023-12-19 NOTE — PLAN
[FreeTextEntry1] : Hypertension, proteinuria: BP improved, c/w Lisinopril 20 mg daily, low salt diet. +100 g proteinuria. Will f/u in 3 months with repeat Urine prot/Cr, BP check. If no improvement in proteinuria, consider renal evaluation. Asthma: following with pulmonary, on Trelegy, symptoms improved.  F/u 3m

## 2023-12-26 ENCOUNTER — NON-APPOINTMENT (OUTPATIENT)
Age: 73
End: 2023-12-26

## 2024-03-29 ENCOUNTER — APPOINTMENT (OUTPATIENT)
Dept: PULMONOLOGY | Facility: CLINIC | Age: 74
End: 2024-03-29
Payer: MEDICARE

## 2024-03-29 VITALS
HEIGHT: 70 IN | OXYGEN SATURATION: 99 % | HEART RATE: 66 BPM | SYSTOLIC BLOOD PRESSURE: 138 MMHG | DIASTOLIC BLOOD PRESSURE: 76 MMHG | TEMPERATURE: 97.6 F | BODY MASS INDEX: 22.19 KG/M2 | WEIGHT: 155 LBS

## 2024-03-29 DIAGNOSIS — R05.1 ACUTE COUGH: ICD-10-CM

## 2024-03-29 PROCEDURE — 71046 X-RAY EXAM CHEST 2 VIEWS: CPT

## 2024-03-29 PROCEDURE — 99213 OFFICE O/P EST LOW 20 MIN: CPT | Mod: 25

## 2024-03-29 PROCEDURE — 95012 NITRIC OXIDE EXP GAS DETER: CPT

## 2024-03-29 PROCEDURE — 94060 EVALUATION OF WHEEZING: CPT

## 2024-03-29 RX ORDER — LISINOPRIL 20 MG/1
20 TABLET ORAL
Qty: 90 | Refills: 1 | Status: DISCONTINUED | COMMUNITY
Start: 2023-12-06 | End: 2024-03-29

## 2024-03-30 NOTE — PROCEDURE
[FreeTextEntry1] : Normal spirometry with no bronchodilator response.  Chest x-ray showed clear lungs, no signs of infiltrate or pleural effusions.

## 2024-03-30 NOTE — HISTORY OF PRESENT ILLNESS
[TextBox_4] : MILTON CHANG is a 74 year old male, with history of asthma w/ COPD overlap, who presents to the office for follow up evaluation. Patient reports of having symptoms of a dry cough that started 3 weeks. He also states of having symptoms of throat irritation and hoarseness that progresses throughout the day. Patient denies of having any symptoms of dyspnea or a fever. He states that he continues to take Trelegy on a daily basis. Patient reports that he has Flonase although is currently not taking it at this time. Lisinopril listed on medication list but patient denies that he is taking

## 2024-03-30 NOTE — ASSESSMENT
[FreeTextEntry1] : Mr. MILTON CHANG is an 74 year old male, history of asthma w/ COPD overlap. Normal lung function indicated in spirometry today. Cough likely secondary to postnasal drip. Prescribed Flonase 2 sprasys each nostril once a day. Continue Trelegy 1 puff once a day.

## 2024-05-05 ENCOUNTER — EMERGENCY (EMERGENCY)
Facility: HOSPITAL | Age: 74
LOS: 1 days | Discharge: HOME | End: 2024-05-05
Attending: EMERGENCY MEDICINE | Admitting: EMERGENCY MEDICINE
Payer: MEDICARE

## 2024-05-05 VITALS
OXYGEN SATURATION: 97 % | DIASTOLIC BLOOD PRESSURE: 83 MMHG | HEIGHT: 69 IN | RESPIRATION RATE: 17 BRPM | TEMPERATURE: 98 F | HEART RATE: 106 BPM | SYSTOLIC BLOOD PRESSURE: 155 MMHG | WEIGHT: 154.98 LBS

## 2024-05-05 PROCEDURE — 73562 X-RAY EXAM OF KNEE 3: CPT | Mod: 26,LT

## 2024-05-05 PROCEDURE — 73562 X-RAY EXAM OF KNEE 3: CPT

## 2024-05-05 PROCEDURE — 96372 THER/PROPH/DIAG INJ SC/IM: CPT

## 2024-05-05 PROCEDURE — 99283 EMERGENCY DEPT VISIT LOW MDM: CPT

## 2024-05-05 PROCEDURE — 99284 EMERGENCY DEPT VISIT MOD MDM: CPT

## 2024-05-05 RX ORDER — OXYCODONE HYDROCHLORIDE 5 MG/1
1 TABLET ORAL
Qty: 15 | Refills: 0
Start: 2024-05-05

## 2024-05-05 RX ORDER — KETOROLAC TROMETHAMINE 30 MG/ML
15 SYRINGE (ML) INJECTION ONCE
Refills: 0 | Status: DISCONTINUED | OUTPATIENT
Start: 2024-05-05 | End: 2024-05-05

## 2024-05-05 RX ORDER — IBUPROFEN 200 MG
1 TABLET ORAL
Qty: 30 | Refills: 0
Start: 2024-05-05

## 2024-05-05 RX ORDER — MORPHINE SULFATE 50 MG/1
4 CAPSULE, EXTENDED RELEASE ORAL ONCE
Refills: 0 | Status: DISCONTINUED | OUTPATIENT
Start: 2024-05-05 | End: 2024-05-05

## 2024-05-05 RX ADMIN — Medication 15 MILLIGRAM(S): at 19:51

## 2024-05-05 RX ADMIN — MORPHINE SULFATE 4 MILLIGRAM(S): 50 CAPSULE, EXTENDED RELEASE ORAL at 19:20

## 2024-05-05 RX ADMIN — MORPHINE SULFATE 4 MILLIGRAM(S): 50 CAPSULE, EXTENDED RELEASE ORAL at 19:50

## 2024-05-05 RX ADMIN — Medication 15 MILLIGRAM(S): at 19:21

## 2024-05-05 NOTE — ED PROVIDER NOTE - PATIENT PORTAL LINK FT
You can access the FollowMyHealth Patient Portal offered by [facility] by registering at the following website: [email or web address]. By joining [facility]’s FollowMyHealth portal, you will also be able to view your health information using other applications (apps) compatible with our system.

## 2024-05-05 NOTE — ED ADULT NURSE NOTE - NSICDXPASTMEDICALHX_GEN_ALL_CORE_FT
PAST MEDICAL HISTORY:  Hand pain, left wrist and thumb    Hepatitis C Resolved after Harvoni treatment    HTN (hypertension)     Insomnia     Pigmented skin lesion     Rheumatoid arthritis

## 2024-05-05 NOTE — ED PROVIDER NOTE - OBJECTIVE STATEMENT
74-year-old male with history of hypertension, hep C complaining of left knee pain and swelling since injury about 1 hour ago.  Patient slipped on wet pavement and states he kicked his left leg out.  And while extending the leg felt a pop and instant pain no distal left leg weakness numbness or tingling.  No other injuries.  No head strike.  Patient has chronic left knee pains and mild swelling, had MRI about 3 weeks ago showing left medial meniscus tear and is supposed to see orthopedist 3 days.  Patient took 1 Advil prior to arrival

## 2024-05-05 NOTE — ED ADULT NURSE NOTE - OBJECTIVE STATEMENT
Pt presents to the ER complaining of left knee pain and swelling after sustaining a trip on fall on the wet payment about an hour prior to arrival. Pt states he has chronic left knee pain. A&OX4. Pt denies SOB, chest pain, nausea, vomiting, dizziness or headache.

## 2024-05-05 NOTE — ED PROVIDER NOTE - NSFOLLOWUPINSTRUCTIONS_ED_ALL_ED_FT
-Take ibuprofen 600 mg every 6 hours as needed for pain.  -In addition to ibuprofen, you can also take Tylenol 1000 mg every 6 hours as needed for pain.  - in addition, You can take oxycodone as directed as needed for stronger pain.  This medication can make you drowsy or unsteady.  Do not drive or operate heavy machinery or perform tasks that require full attention while taking this medication      see orthopedist this week    use crutches. no weight bearing on left leg        Knee Pain    WHAT YOU NEED TO KNOW:    Knee pain may start suddenly, or it may be a long-term problem. You may have pain on the side, front, or back of your knee. You may have knee stiffness and swelling. You may hear popping sounds or feel like your knee is giving way or locking up as you walk. You may feel pain when you sit, stand, walk, or climb up and down stairs. Knee pain can be caused by conditions such as obesity, inflammation, or strains or tears in ligaments or tendons.     DISCHARGE INSTRUCTIONS:    Return to the emergency department if:   •Your pain is worse, even after treatment.       •You cannot bend or straighten your leg completely.       •The swelling around your knee does not go down even with treatment.      •Your knee is painful and hot to the touch.       Contact your healthcare provider if:   •You have questions or concerns about your condition or care.           Medicines: You may need any of the following:   •NSAIDs help decrease swelling and pain or fever. This medicine is available with or without a doctor's order. NSAIDs can cause stomach bleeding or kidney problems in certain people. If you take blood thinner medicine, always ask your healthcare provider if NSAIDs are safe for you. Always read the medicine label and follow directions.      •Acetaminophen decreases pain and fever. It is available without a doctor's order. Ask how much to take and how often to take it. Follow directions. Read the labels of all other medicines you are using to see if they also contain acetaminophen, or ask your doctor or pharmacist. Acetaminophen can cause liver damage if not taken correctly.      •Prescription pain medicine may be given. Ask your healthcare provider how to take this medicine safely. Some prescription pain medicines contain acetaminophen. Do not take other medicines that contain acetaminophen without talking to your healthcare provider. Too much acetaminophen may cause liver damage. Prescription pain medicine may cause constipation. Ask your healthcare provider how to prevent or treat constipation.       •Take your medicine as directed. Contact your healthcare provider if you think your medicine is not helping or if you have side effects. Tell him or her if you are allergic to any medicine. Keep a list of the medicines, vitamins, and herbs you take. Include the amounts, and when and why you take them. Bring the list or the pill bottles to follow-up visits. Carry your medicine list with you in case of an emergency.      What you can do to manage your symptoms:   •Rest your knee so it can heal. Limit activities that increase your pain. Do low-impact exercises, such as walking or swimming.       •Apply ice to help reduce swelling and pain. Use an ice pack, or put crushed ice in a plastic bag. Cover it with a towel before you apply it to your knee. Apply ice for 15 to 20 minutes every hour, or as directed.      •Apply compression to help reduce swelling. Use a brace or bandage only as directed.      •Elevate your knee to help decrease pain and swelling. Elevate your knee while you are sitting or lying down. Prop your leg on pillows to keep your knee above the level of your heart.      •Prevent your knee from moving as directed. Your healthcare provider may put on a cast or splint. You may need to wear a leg brace to stabilize your knee. A leg brace can be adjusted to increase your range of motion as your knee heals.  Hinged Knee Braces            What you can do to prevent knee pain:   •Maintain a healthy weight. Extra weight increases your risk for knee pain. Ask your healthcare provider how much you should weigh. He or she can help you create a safe weight loss plan if you need to lose weight.      •Exercise or train properly. Use the correct equipment for sports. Wear shoes that provide good support. Check your posture often as you exercise, play sports, or train for an event. This can help prevent stress and strain on your knees. Rest between sessions so you do not overwork your knees.      Follow up with your healthcare provider within 24 hours or as directed: You may need follow-up treatments, such as steroid injections to decrease pain. Write down your questions so you remember to ask them during your visits.        Crutch Instructions    WHAT YOU NEED TO KNOW:    Crutches are tools that provide support and balance when you walk. You may need 1 or 2 crutches to help support your body weight. You may need crutches if you had surgery or an injury that affects your ability to walk.    DISCHARGE INSTRUCTIONS:    How to use crutches safely:   •Support your weight with your arms and hands. Do not support your weight with your armpits. This could hurt the nerves that are in your underarms. Keep your elbow bent when the crutch is in place under your arm.      •Walk slowly and carefully with crutches. Go up and down stairs and ramps slowly, and stop to rest when you feel tired. Get up slowly to a sitting or standing position. This will help prevent dizziness and fainting. Use your crutches only on firm ground. Use caution when you walk on ice or snow. Wet or waxed floors and smooth cement floors can be slippery. Watch out for small rugs or cords.       How to walk with crutches:   •Place both crutches under your arms, and place your hands on the hand  of the crutches. Place your crutches slightly in front of you.       •The top of the crutches should be about 2 fingers [id number] (about 1½ inches) below your armpits. Place your weight on your hands. The top of the crutches should not press into your armpits.      •If you have one leg that is injured, keep it off the floor by bending your knee.      •Lift the crutches and move them a step ahead of you. Put the rubber ends of the crutches firmly on the ground. Move the foot that is not injured between the crutches. Place that heel down first.      •If you are using your crutches for balance, move your right foot and left crutch forward. Then move your left foot and right crutch forward. Keep walking this way.  Walking with Crutches           How to go up stairs with crutches:   •Face the stairs. Put the crutches close to the first step.      •Push onto the crutches and put your uninjured leg on the first step.      •Put your weight on your uninjured leg that is on the first step. Bring both crutches and the injured leg onto the step at the same time.      •When you hold onto a railing with one arm, put both crutches under the other arm. Use the railing to help you go up stairs.   Crutches Going Upstairs With Crutches           How to go down stairs with crutches:   •Stand with the toes of your uninjured leg close to the edge of the step.      •Bend the knee of your uninjured leg. Slowly lower both crutches along with the injured leg onto the next step.       •Lean on your crutches. Slowly lower your uninjured leg onto the same step.      •Place both crutches under one arm while you hold onto the railing with the other arm.  Crutches Going Downstairs with Crutches           How to sit in a chair with crutches:   •Turn and back up to the chair until you feel the edge of it against the back of your legs. Keep your injured leg forward.      •Take your crutches out from under your arms. Sit while bending your uninjured knee.  Crutches Sitting Down with Crutches           How to get up from a chair with crutches:   •Sit on the edge of your chair.       •Push up with your hands using the crutches or arms of the chair. Put your weight on your uninjured foot as you get up.      •Keep your injured leg bent at the knee and off the floor.  Crutches Standing Up with Crutches           Contact your healthcare provider if:   •Your crutches do not fit.      •One crutch is longer than the other.      •Your crutches break or get lost.      •The rubber tips of your crutches are split or loose.      •You get blisters or painful calluses on your hands or armpits.      •Your armpit is red, sore, or has bumps or pimples.       •Your arm muscles get weaker the longer you use the crutches.      •You have questions or concerns about your condition or care.      Return to the emergency department if:   •You have sudden numbness in a hand or arm.      •Your fingers feel cold or have cramping pain. -Take ibuprofen 600 mg every 6 hours as needed for pain.  -In addition to ibuprofen, you can also take Tylenol 1000 mg every 6 hours as needed for pain.  - in addition, You can take oxycodone as directed as needed for stronger pain.  This medication can make you drowsy or unsteady.  Do not drive or operate heavy machinery or perform tasks that require full attention while taking this medication      see orthopedist this week    use crutches. no weight bearing on left leg        Patellar Fracture    WHAT YOU NEED TO KNOW:    A patellar fracture is a break in your kneecap.  Knee Anatomy     DISCHARGE INSTRUCTIONS:    Call your local emergency number (911 in the [place]) or have someone call if:    You suddenly feel lightheaded and short of breath.    You have chest pain when you take a deep breath or cough.    You cough up blood.  Return to the emergency department if:    Your cast or splint breaks or gets damaged.    Your foot or toes are swollen, cold, numb, or they turn white or blue.    Your leg feels warm, tender, and painful. It may look swollen and red.  Call your doctor or bone specialist if:    You have a fever.    Your pain gets worse, even after treatment.    You have questions or concerns about your condition or care.  Medicines: You may need any of the following:    Prescription pain medicine may be given. Ask your healthcare provider how to take this medicine safely. Some prescription pain medicines contain acetaminophen. Do not take other medicines that contain acetaminophen without talking to your healthcare provider. Too much acetaminophen may cause liver damage. Prescription pain medicine may cause constipation. Ask your healthcare provider how to prevent or treat constipation.    Antibiotics help fight or treat a bacterial infection.    Take your medicine as directed. Contact your healthcare provider if you think your medicine is not helping or if you have side effects. Tell your provider if you are allergic to any medicine. Keep a list of the medicines, vitamins, and herbs you take. Include the amounts, and when and why you take them. Bring the list or the pill bottles to follow-up visits. Carry your medicine list with you in case of an emergency.  Brace, cast, or splint care:    Check the skin around the device every day. Apply lotion to any red or sore areas.    Ask your healthcare provider when you can bathe. Do not get the device wet. Cover it with 2 plastic bags. Tape the bags above the device to prevent water from getting in. Keep your knee out of the water as much as possible.    Do not push or lean on any part of the cast or splint.    Do not put any sharp or pointed objects inside the cast.  Self-care:    Rest your knee as directed. Crutches help rest and support your knee when you walk. Your healthcare provider will tell you when you can start to use crutches. Follow instructions about how much weight to put on your leg.  Walking with Crutches      Apply ice to help decrease swelling and pain. Use an ice pack, or put crushed ice in a plastic bag. Cover it with a towel before you place it on your knee or supportive device. Apply ice for 15 to 20 minutes every hour for 2 days, or as directed.  Ice and Elevation      Elevate your knee above the level of your heart as often as you can. This will help decrease swelling and pain. Prop your leg on pillows or blankets to keep it elevated comfortably. Do not put pillows directly under your knee.  Elevate Leg  Go to physical therapy, if recommended. A physical therapist teaches you exercises to help improve movement and strength, and to decrease pain.    Follow up with your doctor or bone specialist as directed: You may need to return to have your stitches removed. Write down your questions so you remember to ask them during your visits.          Crutch Instructions    WHAT YOU NEED TO KNOW:    Crutches are tools that provide support and balance when you walk. You may need 1 or 2 crutches to help support your body weight. You may need crutches if you had surgery or an injury that affects your ability to walk.    DISCHARGE INSTRUCTIONS:    How to use crutches safely:   •Support your weight with your arms and hands. Do not support your weight with your armpits. This could hurt the nerves that are in your underarms. Keep your elbow bent when the crutch is in place under your arm.      •Walk slowly and carefully with crutches. Go up and down stairs and ramps slowly, and stop to rest when you feel tired. Get up slowly to a sitting or standing position. This will help prevent dizziness and fainting. Use your crutches only on firm ground. Use caution when you walk on ice or snow. Wet or waxed floors and smooth cement floors can be slippery. Watch out for small rugs or cords.       How to walk with crutches:   •Place both crutches under your arms, and place your hands on the hand  of the crutches. Place your crutches slightly in front of you.       •The top of the crutches should be about 2 fingers [id number] (about 1½ inches) below your armpits. Place your weight on your hands. The top of the crutches should not press into your armpits.      •If you have one leg that is injured, keep it off the floor by bending your knee.      •Lift the crutches and move them a step ahead of you. Put the rubber ends of the crutches firmly on the ground. Move the foot that is not injured between the crutches. Place that heel down first.      •If you are using your crutches for balance, move your right foot and left crutch forward. Then move your left foot and right crutch forward. Keep walking this way.  Walking with Crutches           How to go up stairs with crutches:   •Face the stairs. Put the crutches close to the first step.      •Push onto the crutches and put your uninjured leg on the first step.      •Put your weight on your uninjured leg that is on the first step. Bring both crutches and the injured leg onto the step at the same time.      •When you hold onto a railing with one arm, put both crutches under the other arm. Use the railing to help you go up stairs.   Crutches Going Upstairs With Crutches           How to go down stairs with crutches:   •Stand with the toes of your uninjured leg close to the edge of the step.      •Bend the knee of your uninjured leg. Slowly lower both crutches along with the injured leg onto the next step.       •Lean on your crutches. Slowly lower your uninjured leg onto the same step.      •Place both crutches under one arm while you hold onto the railing with the other arm.  Crutches Going Downstairs with Crutches           How to sit in a chair with crutches:   •Turn and back up to the chair until you feel the edge of it against the back of your legs. Keep your injured leg forward.      •Take your crutches out from under your arms. Sit while bending your uninjured knee.  Crutches Sitting Down with Crutches           How to get up from a chair with crutches:   •Sit on the edge of your chair.       •Push up with your hands using the crutches or arms of the chair. Put your weight on your uninjured foot as you get up.      •Keep your injured leg bent at the knee and off the floor.  Crutches Standing Up with Crutches           Contact your healthcare provider if:   •Your crutches do not fit.      •One crutch is longer than the other.      •Your crutches break or get lost.      •The rubber tips of your crutches are split or loose.      •You get blisters or painful calluses on your hands or armpits.      •Your armpit is red, sore, or has bumps or pimples.       •Your arm muscles get weaker the longer you use the crutches.      •You have questions or concerns about your condition or care.      Return to the emergency department if:   •You have sudden numbness in a hand or arm.      •Your fingers feel cold or have cramping pain. -Take ibuprofen 600 mg every 6 hours as needed for pain.  - in addition, You can take your prescribed hydrocodone-acetaminophen as directed as needed for stronger pain.  This medication can make you drowsy or unsteady.  Do not drive or operate heavy machinery or perform tasks that require full attention while taking this medication      see orthopedist this week    use crutches. no weight bearing on left leg        Patellar Fracture    WHAT YOU NEED TO KNOW:    A patellar fracture is a break in your kneecap.  Knee Anatomy     DISCHARGE INSTRUCTIONS:    Call your local emergency number (911 in the [place]) or have someone call if:    You suddenly feel lightheaded and short of breath.    You have chest pain when you take a deep breath or cough.    You cough up blood.  Return to the emergency department if:    Your cast or splint breaks or gets damaged.    Your foot or toes are swollen, cold, numb, or they turn white or blue.    Your leg feels warm, tender, and painful. It may look swollen and red.  Call your doctor or bone specialist if:    You have a fever.    Your pain gets worse, even after treatment.    You have questions or concerns about your condition or care.  Medicines: You may need any of the following:    Prescription pain medicine may be given. Ask your healthcare provider how to take this medicine safely. Some prescription pain medicines contain acetaminophen. Do not take other medicines that contain acetaminophen without talking to your healthcare provider. Too much acetaminophen may cause liver damage. Prescription pain medicine may cause constipation. Ask your healthcare provider how to prevent or treat constipation.    Antibiotics help fight or treat a bacterial infection.    Take your medicine as directed. Contact your healthcare provider if you think your medicine is not helping or if you have side effects. Tell your provider if you are allergic to any medicine. Keep a list of the medicines, vitamins, and herbs you take. Include the amounts, and when and why you take them. Bring the list or the pill bottles to follow-up visits. Carry your medicine list with you in case of an emergency.  Brace, cast, or splint care:    Check the skin around the device every day. Apply lotion to any red or sore areas.    Ask your healthcare provider when you can bathe. Do not get the device wet. Cover it with 2 plastic bags. Tape the bags above the device to prevent water from getting in. Keep your knee out of the water as much as possible.    Do not push or lean on any part of the cast or splint.    Do not put any sharp or pointed objects inside the cast.  Self-care:    Rest your knee as directed. Crutches help rest and support your knee when you walk. Your healthcare provider will tell you when you can start to use crutches. Follow instructions about how much weight to put on your leg.  Walking with Crutches      Apply ice to help decrease swelling and pain. Use an ice pack, or put crushed ice in a plastic bag. Cover it with a towel before you place it on your knee or supportive device. Apply ice for 15 to 20 minutes every hour for 2 days, or as directed.  Ice and Elevation      Elevate your knee above the level of your heart as often as you can. This will help decrease swelling and pain. Prop your leg on pillows or blankets to keep it elevated comfortably. Do not put pillows directly under your knee.  Elevate Leg  Go to physical therapy, if recommended. A physical therapist teaches you exercises to help improve movement and strength, and to decrease pain.    Follow up with your doctor or bone specialist as directed: You may need to return to have your stitches removed. Write down your questions so you remember to ask them during your visits.          Crutch Instructions    WHAT YOU NEED TO KNOW:    Crutches are tools that provide support and balance when you walk. You may need 1 or 2 crutches to help support your body weight. You may need crutches if you had surgery or an injury that affects your ability to walk.    DISCHARGE INSTRUCTIONS:    How to use crutches safely:   •Support your weight with your arms and hands. Do not support your weight with your armpits. This could hurt the nerves that are in your underarms. Keep your elbow bent when the crutch is in place under your arm.      •Walk slowly and carefully with crutches. Go up and down stairs and ramps slowly, and stop to rest when you feel tired. Get up slowly to a sitting or standing position. This will help prevent dizziness and fainting. Use your crutches only on firm ground. Use caution when you walk on ice or snow. Wet or waxed floors and smooth cement floors can be slippery. Watch out for small rugs or cords.       How to walk with crutches:   •Place both crutches under your arms, and place your hands on the hand  of the crutches. Place your crutches slightly in front of you.       •The top of the crutches should be about 2 fingers [id number] (about 1½ inches) below your armpits. Place your weight on your hands. The top of the crutches should not press into your armpits.      •If you have one leg that is injured, keep it off the floor by bending your knee.      •Lift the crutches and move them a step ahead of you. Put the rubber ends of the crutches firmly on the ground. Move the foot that is not injured between the crutches. Place that heel down first.      •If you are using your crutches for balance, move your right foot and left crutch forward. Then move your left foot and right crutch forward. Keep walking this way.  Walking with Crutches           How to go up stairs with crutches:   •Face the stairs. Put the crutches close to the first step.      •Push onto the crutches and put your uninjured leg on the first step.      •Put your weight on your uninjured leg that is on the first step. Bring both crutches and the injured leg onto the step at the same time.      •When you hold onto a railing with one arm, put both crutches under the other arm. Use the railing to help you go up stairs.   Crutches Going Upstairs With Crutches           How to go down stairs with crutches:   •Stand with the toes of your uninjured leg close to the edge of the step.      •Bend the knee of your uninjured leg. Slowly lower both crutches along with the injured leg onto the next step.       •Lean on your crutches. Slowly lower your uninjured leg onto the same step.      •Place both crutches under one arm while you hold onto the railing with the other arm.  Crutches Going Downstairs with Crutches           How to sit in a chair with crutches:   •Turn and back up to the chair until you feel the edge of it against the back of your legs. Keep your injured leg forward.      •Take your crutches out from under your arms. Sit while bending your uninjured knee.  Crutches Sitting Down with Crutches           How to get up from a chair with crutches:   •Sit on the edge of your chair.       •Push up with your hands using the crutches or arms of the chair. Put your weight on your uninjured foot as you get up.      •Keep your injured leg bent at the knee and off the floor.  Crutches Standing Up with Crutches           Contact your healthcare provider if:   •Your crutches do not fit.      •One crutch is longer than the other.      •Your crutches break or get lost.      •The rubber tips of your crutches are split or loose.      •You get blisters or painful calluses on your hands or armpits.      •Your armpit is red, sore, or has bumps or pimples.       •Your arm muscles get weaker the longer you use the crutches.      •You have questions or concerns about your condition or care.      Return to the emergency department if:   •You have sudden numbness in a hand or arm.      •Your fingers feel cold or have cramping pain.

## 2024-05-05 NOTE — ED ADULT NURSE NOTE - NSFALLRISKINTERV_ED_ALL_ED

## 2024-05-05 NOTE — ED PROVIDER NOTE - IV ALTEPLASE EXCL REL HIDDEN
Cr 1.5 on presentation, and previous baseline appears to be around 1.0. Suspect due to volume depletion from diarrhea In the setting of chronic diuretic and ACEi use.  Estimated Creatinine Clearance: 28 mL/min (A) (based on SCr of 1.5 mg/dL (H)). according to latest data. Will give IVF repletion and maintenance. Monitor urine output and serial BMP and adjust therapy as needed. Avoid nephrotoxins and renally dose meds for GFR listed above.   show

## 2024-05-05 NOTE — ED PROVIDER NOTE - CARE PROVIDER_API CALL
Jesus Marie  Orthopaedic Surgery  [address], [address]  [place] [address]  Phone: [phone]  Fax: [phone]  Follow Up Time: 1-3 Days

## 2024-05-05 NOTE — ED ADULT NURSE NOTE - NSICDXFAMILYHX_GEN_ALL_CORE_FT
FAMILY HISTORY:  Father  Still living? Yes, Estimated age: 81-90  Family history of diabetes mellitus in father, Age at diagnosis: Age Unknown

## 2024-05-05 NOTE — ED PROVIDER NOTE - PHYSICAL EXAMINATION
exam:   General: well appearing, NAD.   HEENT: eyes perrl, nose normal, head without swelling/tenderness/ discoloration or other signs of trauma  cor: RRR, s1s2, 2+rad pulses.   lungs: ctabl, no resp distress.   abd: soft, ntnd.   neuro: a&ox3, cn2-12 intact, DA SILVA, 5/5 strength c nl sensation all extremities, nl coordination.   MSK: no c/t/L spine tenderness. nontender pelvis/hips, FROM hips without pain.    Left knee with moderate generalized swelling with moderate diffuse tenderness.  Lower leg ankle and foot nontender, soft compartments, 2+ DP.  5/5 strength to distal left lower extremity with normal sensation.  Left hip proximal thigh nontender.  Muscle mass defect anterior distal thigh.  No active extension of knee.  able to flex knee actively  Skin: normal, no rash

## 2024-05-05 NOTE — ED PROVIDER NOTE - CLINICAL SUMMARY MEDICAL DECISION MAKING FREE TEXT BOX
74-year-old male with history of hypertension, hep C complaining of left knee pain and swelling since injury about 1 hour ago.  Patient slipped on wet pavement and states he kicked his left leg out.  And while extending the leg felt a pop and instant pain no distal left leg weakness numbness or tingling.  No other injuries.  No head strike.  Patient has chronic left knee pains and mild swelling, had MRI about 3 weeks ago showing left medial meniscus tear and is supposed to see orthopedist 3 days.  Patient took 1 Advil prior to arrival    Patient appears uncomfortable, in pain.  Likely cause of mild tachycardia.  Patient with moderate diffuse left knee swelling/effusion neurovascularly intact left lower extremity.  Unable to extend the knee with gross muscular defect distal anterior thigh with, likely quadriceps tendon rupture.  Partial DDx: Knee sprain, patellar dislocation, knee fracture.  Will treat with pain with IM Toradol and morphine.  Placed knee immobilizer.  crutches, NWB    X-ray shows no bony injury.  Follow-up with orthopedics.  oxycodone, ibuprofen prescribed

## 2024-05-06 ENCOUNTER — EMERGENCY (EMERGENCY)
Facility: HOSPITAL | Age: 74
LOS: 1 days | Discharge: HOME | End: 2024-05-06
Attending: STUDENT IN AN ORGANIZED HEALTH CARE EDUCATION/TRAINING PROGRAM | Admitting: STUDENT IN AN ORGANIZED HEALTH CARE EDUCATION/TRAINING PROGRAM
Payer: MEDICARE

## 2024-05-06 VITALS
RESPIRATION RATE: 16 BRPM | WEIGHT: 154.98 LBS | DIASTOLIC BLOOD PRESSURE: 89 MMHG | HEART RATE: 82 BPM | HEIGHT: 69 IN | SYSTOLIC BLOOD PRESSURE: 171 MMHG | TEMPERATURE: 97 F | OXYGEN SATURATION: 99 %

## 2024-05-06 DIAGNOSIS — Z90.89 ACQUIRED ABSENCE OF OTHER ORGANS: Chronic | ICD-10-CM

## 2024-05-06 DIAGNOSIS — Z98.890 OTHER SPECIFIED POSTPROCEDURAL STATES: Chronic | ICD-10-CM

## 2024-05-06 PROBLEM — M25.562 LEFT KNEE PAIN: Status: ACTIVE | Noted: 2024-05-06

## 2024-05-06 PROCEDURE — 96372 THER/PROPH/DIAG INJ SC/IM: CPT

## 2024-05-06 PROCEDURE — 99284 EMERGENCY DEPT VISIT MOD MDM: CPT

## 2024-05-06 PROCEDURE — 99283 EMERGENCY DEPT VISIT LOW MDM: CPT

## 2024-05-06 RX ORDER — MORPHINE SULFATE 50 MG/1
4 CAPSULE, EXTENDED RELEASE ORAL ONCE
Refills: 0 | Status: DISCONTINUED | OUTPATIENT
Start: 2024-05-06 | End: 2024-05-06

## 2024-05-06 RX ADMIN — MORPHINE SULFATE 4 MILLIGRAM(S): 50 CAPSULE, EXTENDED RELEASE ORAL at 12:07

## 2024-05-06 RX ADMIN — MORPHINE SULFATE 4 MILLIGRAM(S): 50 CAPSULE, EXTENDED RELEASE ORAL at 11:37

## 2024-05-06 NOTE — ED PROVIDER NOTE - OBJECTIVE STATEMENT
74M PMHx of RA, Hepatitis C, hypertension presenting with left knee pain s/p mechanical fall yesterday night. Was seen in the ED last night, 5/5 74M PMHx of RA, Hepatitis C, hypertension presenting with left knee pain s/p mechanical fall yesterday night. Was seen in the ED last night, 5/5, and diagnosed with patella fx, placed in knee immobilizer and follow up with ortho outpatient. Patient began having worsening pain with otc medications and did not call an orthopedist yet. Mild swelling and pain to the knee joint. No discoloration or open wounds. Walking with a crutch. Denies any chest pain, abdominal pain, shortness of breath, nausea/vomiting, headaches, fevers, chills,  weakness, syncope,   urinary symptoms, subjective neurological deficits, numbness/tingling of leg.

## 2024-05-06 NOTE — ED ADULT NURSE NOTE - IS THE PATIENT ABLE TO BE SCREENED?
Call Peter Wynn with [address] [phone] post op hosp f/u says needs jerald changed Yes

## 2024-05-06 NOTE — ED PROVIDER NOTE - PATIENT PORTAL LINK FT
You can access the FollowMyHealth Patient Portal offered by [facility] by registering at the following website: [email or web address]. By joining [facility]’s FollowMyHealth portal, you will also be able to view your health information using other applications (apps) compatible with our system.

## 2024-05-06 NOTE — ED PROVIDER NOTE - NSFOLLOWUPINSTRUCTIONS_ED_ALL_ED_FT
Rest, drink plenty of fluids.  Advance activity as tolerated.  Continue all previously prescribed medications as directed.  Follow up with your PMD 2-3 days and bring copies of your results.  Return to the ER for worsening symptoms, increased swelling, numbness/tingling, increased pain, nausea/vomiting, discoloration, or new concerning symptoms.    Follow up with orthopedics as scheduled.

## 2024-05-06 NOTE — ED PROVIDER NOTE - PHYSICAL EXAMINATION
VITAL SIGNS: I have reviewed nursing notes and confirm.   GEN: Well-developed; well-nourished; in no acute distress. Speaking full sentences.  SKIN: Warm, pink, no rash, no diaphoresis, no cyanosis, well perfused.   HEAD: Normocephalic; atraumatic. No scalp lacerations, no abrasions.  NECK: Supple; non tender.   ENT: No nasal discharge; airway clear. Trachea is midline. Normal dentition.  CV: RRR. S1, S2 normal; no murmurs, gallops, or rubs. Capillary refill < 2 seconds throughout. Distal pulses intact 2+ throughout.  RESP: CTA bilaterally. No wheezes, rales, or rhonchi.   ABD: Normal bowel sounds, soft, non-distended, non-tender, no rebound, no guarding, no rigidity   MSK: Normal range of motion and movement of all UE and RIGHT LE; (+) LEFT LEG: in knee immobilizer, knee joint with moderate effusion, swelling, ttp, no discoloration, distal pulses 2+, neurovascularly intact, sensation to soft touch intact.   NEURO: Alert & oriented x 3,  Normal speech and coordination.

## 2024-05-06 NOTE — CHART NOTE - NSCHARTNOTEFT_GEN_A_CORE
Emergency Department Social Work Geriatric Initial Assessment    Cognitive Mental Status - Alert, oriented  Primary Caregiver Information – Rosalina Bocanegra, spouse [phone]  Emergency Contact Information – Rosalina Bocanegra  Primary Care Physician / Specialists -  Functional Status Prior to ED Visit - Independent  Family and Social Support – Patient has spousal support  Living Arrangement - Patient lives with spouse  Services Present on Admission – No services on admission  Assistive Devices (Durable Medical Equipment) – Prior to ED admission, the patient ambulates independent of assistive device  ADLs & Degree of [place] – Patient is independent with ADL and IADL  Barriers to obtain medications - No barriers to obtaining medications  Barriers to attend medical appointments - No barriers to attending medical appointments  ISAR Score – 1  Advanced Directives –   Follow up care – Orthopaedic  Discharge Transportation – Spouse to transport patient home  Summary/Recommendations/Referrals -    74 y o male presenting to the ED for knee pain/injury.  Prior to ED visit, patient was independent with ambulation.  ED Provider recommends orthopaedic follow up.  Patient was agreeable to a scheduled orthopaedic on 5/7/24 @ 3:30 pm with Dr. Jesus Marie.  Patient provided with contact information for further assistance if required.

## 2024-05-06 NOTE — ED PROVIDER NOTE - CLINICAL SUMMARY MEDICAL DECISION MAKING FREE TEXT BOX
Dr. Derrick Rosales MD, EM And Medical Toxicology AttendinM PMHx of RA, Hepatitis C, hypertension presenting with left knee pain s/p mechanical fall yesterday night. Was seen in the ED last night, , and diagnosed with patella fx, placed in knee immobilizer and follow up with ortho outpatient. Patient began having worsening pain with otc medications and did not call an orthopedist yet. Mild swelling and pain to the knee joint. No discoloration or open wounds. Walking with a crutch. Denies any chest pain, abdominal pain, shortness of breath, nausea/vomiting, headaches, fevers, chills,  weakness, syncope,   urinary symptoms, subjective neurological deficits, numbness/tingling of leg.  History obtained from independent historian: wife  External note reviewed: prior ED notes  DDx: patella fx, closed, worsening pain  Decision making, ED course, independent interpretation of imaging studies, and consults:   - Already in knee immobilizer. Patient comes back to ED for persistent pain despite OTC medications. They are aware of fracture requiring surgery and is requesting surgery for today. However, I discussed that this is not an urgent or emergent condition and orthopedics will likely NOT schedule for emergent surgery today, and more likely outpatient scheduling for surgery. We will arrange for appt with ortho through . Patient agreeable.     Consideration hospitalization vs de-escalation of care: dc  Disposition: DC

## 2024-05-06 NOTE — ED ADULT NURSE NOTE - PRIMARY CARE PROVIDER
[address], [address]    Patient's Name: Kaiser Lou   Age: 42 y.o. YOB: 1982   Sex: female    Date:   5/5/2022    Insurance:  Miguel Peaadriana            Session: 6/6  Surgeon:  Erin Vaca    Height: 64 inches  Weight:    237      Lbs. BMI: 40.7   Pounds Lost since last month: 0                 Pounds Gained since last month: 0    Starting Weight: 244     Previous Months Weight: 237  Overall Pounds Lost: 7   Overall Pounds Gained: 0    Patient has been to a support group meeting. Do you smoke? no    Alcohol intake:  Number of drinks at a time:  none    Class Guidelines    Guidelines are reviewed with patient at the start of every class. 1. Patient understands that weight loss trial classes must be consecutive. Patient understands if they miss a class, it is their responsibility to contact me to reschedule class. I will reach out to patient after their first no show. 2.  Patient understands the expectations that weight maintenance/weight loss is expected during the classes. Failure to demonstrate changes may result in one extra month of weight loss trial, followed by going back to see the surgeon. Patient understands that they CAN NOT gain any weight during the weight loss trial.  Gaining weight will result in extra classes. 3. Patient is also instructed to be doing their labs, blood work, psych visit, support group and any other test that the surgeon has used while they are working on their weight loss trial.  4.  Patient was instructed to bring their blue binder to every class and appoin    Changes Made Since Last Class: tried pre-op liquid diet    Eating Habits and Behaviors      We started off class today by reviewing key diet principles.   Patient was given a very specific list of foods that they can eat, which included meat, fish, vegetables, eggs, cheese, fats, soy, and berries. Patient was also given a list of foods that should be avoided. These included sweets (candy, soda, baked goods, ice cream), and starches, including pasta, rice, crackers, chips, oatmeal, bread. We talked about appropriate protein-based snacks, including deli meat, low fat cheese, yogurt, hummus, small handful of almonds. We talked about working on sipping fluid throughout the day and working towards 64 ounces. I have recommended water, Crystal light, sugar free drinks, but eliminating soda, sweet tea, and fruit juices. I also gave a power point on ways to help with the metabolism. Some ways that can help boost one's metabolism include healthy snacking. Eating 6 small meals in the pre op phase can help keep the metabolism revved up. It is recommended to focus on protein-based snacks. Exercise is another way to increase resting metabolic rate. The more lean muscle one has, the more calories they will burn at rest.  Dehydration can slow down one's metabolism, as well. Patient is encouraged to keep sipping to work towards 64 ounces of fluid per day. Protein is another booster for metabolism. Foods high in carbohydrates and fat slow down the metabolism. Patient's current diet habits include: Patient is eating 3 meals a day. Patient states their portions are **.  I have encouraged patient to use a smaller plate and fill up on water before meals to help cut down on portions. Patient is eating out: none* Patient's is eating bread, rice, pasta, cereal, and other carbohydrates. Patient is snacking 2-3 times. I have talked about the importance of getting into the habit now of cutting the sweets out. Fluid intake:  80 ounces of water. Patient is encouraged not to drink calories and stick to non-carbonated, non-caffeine, sugar free drinks. The goal is 64 ounces of fluid per day. Physical Activity/Exercise    Comments:     Currently for exercise, patient is walking, dancing.   We talked about activities for patient to do, including walking, swimming, or chair exercises. I also talked with patient about doing some strength training, which helps the metabolism, as well. Patient understands the importance of establishing an activity routine and that surgery is only a small piece of puzzle, but exercise and diet changes will play a role in long term success. Behavior Modification       Comments: In the power point, Portion Control: The importance of measuring out portions and reading food labels. This  include not skipping meals and being sure to feed and fuel that body rather than going large gaps and putting the body in starvation mode. Patient is encouraged to eat within 1 hour of waking up and have a cut off time in the evening. We talked about night time syndrome where a person may skip breakfast and lunch and then consume the majority of their calories from dinner until bed time. I  have talked about how important it it to get 3 meals in per day, eat breakfast, and BREAK THE FAST. One goal for next month includes:  1.   More protein      Taj Lyon  5/5/2022 PMD

## 2024-05-06 NOTE — ED ADULT NURSE NOTE - NSFALLHARMRISKINTERV_ED_ALL_ED

## 2024-05-07 ENCOUNTER — APPOINTMENT (OUTPATIENT)
Dept: ORTHOPEDIC SURGERY | Facility: CLINIC | Age: 74
End: 2024-05-07
Payer: MEDICARE

## 2024-05-07 VITALS
DIASTOLIC BLOOD PRESSURE: 76 MMHG | WEIGHT: 155 LBS | HEIGHT: 69 IN | HEART RATE: 119 BPM | BODY MASS INDEX: 22.96 KG/M2 | SYSTOLIC BLOOD PRESSURE: 122 MMHG | OXYGEN SATURATION: 94 %

## 2024-05-07 DIAGNOSIS — M25.562 PAIN IN LEFT KNEE: ICD-10-CM

## 2024-05-07 PROCEDURE — 72170 X-RAY EXAM OF PELVIS: CPT

## 2024-05-07 PROCEDURE — 99204 OFFICE O/P NEW MOD 45 MIN: CPT

## 2024-05-07 PROCEDURE — 73564 X-RAY EXAM KNEE 4 OR MORE: CPT | Mod: LT

## 2024-05-07 NOTE — HISTORY OF PRESENT ILLNESS
[de-identified] : Carlos Bocanegra is a 74-year-old male who presented to the office for evaluation of his left knee pain.  On Sunday, patient slipped outside his home and fell backwards.  He felt his body pushed against his leg and heard a pop.  He went to the emergency department was found to have a proximal pole of the patella fracture.  Patient was given pain medications and knee immobilizer.  He returned to the ER yesterday.  He continues to have knee pain.  Patient is unable to straight leg raise.  There was no reported head strike.  No fevers or chills.  No hip pain.  No back pain.  History: Rheumatoid Arthritis, Asthma, Insomnia, Cholangiocarcinoma s/p IR [place] in [place] [facility]

## 2024-05-07 NOTE — PHYSICAL EXAM
[de-identified] : Constitutional: 74 year old male, alert and oriented, cooperative, in no acute distress.  HEENT  NC/AT.  Appearance: symmetric  Neck/Back Straight without deformity or instability.  Good ROM.  Chest/Respiratory  Respiratory effort: no intercostal retractions or use of accessory muscles. Nonlabored Breathing  Skin  On inspection, warm and dry without rashes or lesions.  Mental Status:  Judgment, insight: intact Orientation: oriented to time, place, and person  Neurological: Sensory and Motor are grossly intact throughout  Left Knee  Inspection:     Skin intact, no rashes or lesions. There is bruising over the knee     Large Effusion     There is a palpable gap at the quadriceps tendon     Tenderness over the knee  Range of Motion: 	Extension - 0 degrees (passive), unable to SLR 	Flexion - 80 degrees 	Extensor lag: Unable to SLR  Neurologic Exam     Motor intact including 5/5 Extensor Hallucis Longus, 5/5 Flexor Hallucis Longus, 5/5 Tibialis Anterior and 5/5 Gastrocnemius     Sensation Intact to Light Touch including Saphenous, Sural, Superficial Peroneal, Deep Peroneal, Tibial nerve distributions  Vascular Exam     Foot is warm and well perfused with 2+ Dorsalis Pedis Pulse  [de-identified] : XRay:  XRays of the Pelvis (1 View) taken in the office today and discussed with the patient. XRays demonstrate no obvious fracture or dislocation. There is no significant evidence of osteoarthritis or osteophyte formation. (my personal interpretation).  XRay:  XRays of the Left Knee (4 Views) taken in the office today and discussed with the patient. XRays demonstrate a proximal pole of the patella fracture. There is patella baja and proximal displacement of the fracture.. (my personal interpretation).    ACC: [id number] EXAM: XR KNEE AP LAT OBL 3 VIEWS LT ORDERED BY: ANGELIKA CANNON  PROCEDURE DATE: 05/05/2024    INTERPRETATION: Left knee. 3 views. Patient has local pain after injury.  There is a horizontal fracture of the upper patella with slight displacement and extensive effusion and swelling.  Left knee is rather free of degeneration. Patellar fracture is slightly comminuted superiorly.  IMPRESSION: Somewhat comminuted superior patellar fracture with slight displacement.  --- End of Report ---      MAL ANNE MD; Attending Radiologist This document has been electronically signed. May 6 2024 11:03AM

## 2024-05-08 ENCOUNTER — APPOINTMENT (OUTPATIENT)
Dept: PULMONOLOGY | Facility: CLINIC | Age: 74
End: 2024-05-08
Payer: MEDICARE

## 2024-05-08 ENCOUNTER — NON-APPOINTMENT (OUTPATIENT)
Age: 74
End: 2024-05-08

## 2024-05-08 ENCOUNTER — APPOINTMENT (OUTPATIENT)
Dept: INTERNAL MEDICINE | Facility: CLINIC | Age: 74
End: 2024-05-08
Payer: MEDICARE

## 2024-05-08 VITALS — DIASTOLIC BLOOD PRESSURE: 80 MMHG | SYSTOLIC BLOOD PRESSURE: 150 MMHG

## 2024-05-08 VITALS — OXYGEN SATURATION: 97 % | HEART RATE: 106 BPM | SYSTOLIC BLOOD PRESSURE: 163 MMHG | DIASTOLIC BLOOD PRESSURE: 74 MMHG

## 2024-05-08 DIAGNOSIS — Z01.818 ENCOUNTER FOR OTHER PREPROCEDURAL EXAMINATION: ICD-10-CM

## 2024-05-08 DIAGNOSIS — Z85.09 PERSONAL HISTORY OF MALIGNANT NEOPLASM OF OTHER DIGESTIVE ORGANS: ICD-10-CM

## 2024-05-08 DIAGNOSIS — J44.89 OTHER SPECIFIED CHRONIC OBSTRUCTIVE PULMONARY DISEASE: ICD-10-CM

## 2024-05-08 DIAGNOSIS — M06.9 RHEUMATOID ARTHRITIS, UNSPECIFIED: ICD-10-CM

## 2024-05-08 LAB — POCT - HEMOGLOBIN (HGB), QUANTITATIVE, TRANSCUTANEOUS: 11.6

## 2024-05-08 PROCEDURE — ZZZZZ: CPT

## 2024-05-08 PROCEDURE — 99214 OFFICE O/P EST MOD 30 MIN: CPT

## 2024-05-08 PROCEDURE — 88738 HGB QUANT TRANSCUTANEOUS: CPT

## 2024-05-08 PROCEDURE — 99214 OFFICE O/P EST MOD 30 MIN: CPT | Mod: 25

## 2024-05-08 PROCEDURE — 94010 BREATHING CAPACITY TEST: CPT

## 2024-05-08 PROCEDURE — 94729 DIFFUSING CAPACITY: CPT

## 2024-05-08 PROCEDURE — 36415 COLL VENOUS BLD VENIPUNCTURE: CPT

## 2024-05-08 PROCEDURE — 95012 NITRIC OXIDE EXP GAS DETER: CPT

## 2024-05-08 PROCEDURE — 94727 GAS DIL/WSHOT DETER LNG VOL: CPT

## 2024-05-08 PROCEDURE — 93000 ELECTROCARDIOGRAM COMPLETE: CPT | Mod: 59

## 2024-05-08 NOTE — PROCEDURE
[FreeTextEntry1] : PFT 5/8/24 minimal OAD  Ratio 75  Lung Volumes nl  DLCO 96 %  HGB 11.6 NIOX  37 ppb pos  airway inflammation  Chest x-ray showed clear lungs, no signs of infiltrate or pleural effusions.

## 2024-05-08 NOTE — PHYSICAL EXAM
[No Acute Distress] : no acute distress [Normal Oropharynx] : normal oropharynx [Normal Appearance] : normal appearance [No Neck Mass] : no neck mass [Normal Rate/Rhythm] : normal rate/rhythm [Normal S1, S2] : normal s1, s2 [No Murmurs] : no murmurs [No Resp Distress] : no resp distress [Clear to Auscultation Bilaterally] : clear to auscultation bilaterally [No Abnormalities] : no abnormalities [Benign] : benign [Not Tender] : not tender [Normal Bowel Sounds] : normal bowel sounds [No Clubbing] : no clubbing [No Cyanosis] : no cyanosis [Normal Color/ Pigmentation] : normal color/ pigmentation [No Focal Deficits] : no focal deficits [Oriented x3] : oriented x3 [Normal Affect] : normal affect [TextBox_99] : ambulating with Crutches [TextBox_105] : left knee edema sec Left patellar fx

## 2024-05-08 NOTE — ASSESSMENT
[FreeTextEntry1] : Mr. MILTON CHANG is an 74 year old male, history of asthma w/ COPD overlap. Normal lung function indicated in spirometry today. Elevated NIOX consistent with air way  inflammation.  No  active Resp sxs Prescribed Flonase 2 sprasys each nostril once a day. Continue Trelegy 1 puff once a day. No pulmonary  contraindications for ORTHO  surgery

## 2024-05-08 NOTE — HISTORY OF PRESENT ILLNESS
[Former] : former [TextBox_4] : MILTON CHANG is a 74 year old male, with history of asthma w/ COPD overlap, who presents to the office for follow up evaluation. Patient reports of having symptoms of a dry cough that started 3 weeks. He also states of having symptoms of throat irritation and hoarseness that progresses throughout the day. Patient denies of having any symptoms of dyspnea or a fever. He states that he continues to take Trelegy on a daily basis. Patient reports that he has Flonase although is currently not taking it at this time. Lisinopril listed on medication list but patient denies that he is taking Noted liver lesion pos Cholangiocarcinoma  txed with ablation  Pulmonary PREOP  Left Patellar fx post traumatic  fall [TextBox_11] : 3/4 [YearQuit] : 2006

## 2024-05-09 ENCOUNTER — OUTPATIENT (OUTPATIENT)
Dept: OUTPATIENT SERVICES | Facility: HOSPITAL | Age: 74
LOS: 1 days | End: 2024-05-09

## 2024-05-09 VITALS
DIASTOLIC BLOOD PRESSURE: 78 MMHG | HEART RATE: 77 BPM | HEIGHT: 69 IN | RESPIRATION RATE: 14 BRPM | TEMPERATURE: 99 F | OXYGEN SATURATION: 99 % | WEIGHT: 156.09 LBS | SYSTOLIC BLOOD PRESSURE: 130 MMHG

## 2024-05-09 DIAGNOSIS — Z90.89 ACQUIRED ABSENCE OF OTHER ORGANS: Chronic | ICD-10-CM

## 2024-05-09 DIAGNOSIS — I10 ESSENTIAL (PRIMARY) HYPERTENSION: ICD-10-CM

## 2024-05-09 DIAGNOSIS — S76.101A UNSPECIFIED INJURY OF RIGHT QUADRICEPS MUSCLE, FASCIA AND TENDON, INITIAL ENCOUNTER: ICD-10-CM

## 2024-05-09 DIAGNOSIS — Z98.890 OTHER SPECIFIED POSTPROCEDURAL STATES: Chronic | ICD-10-CM

## 2024-05-09 DIAGNOSIS — Z91.89 OTHER SPECIFIED PERSONAL RISK FACTORS, NOT ELSEWHERE CLASSIFIED: ICD-10-CM

## 2024-05-09 DIAGNOSIS — S76.112A STRAIN OF LEFT QUADRICEPS MUSCLE, FASCIA AND TENDON, INITIAL ENCOUNTER: ICD-10-CM

## 2024-05-09 DIAGNOSIS — J44.9 CHRONIC OBSTRUCTIVE PULMONARY DISEASE, UNSPECIFIED: ICD-10-CM

## 2024-05-09 DIAGNOSIS — Z87.39 PERSONAL HISTORY OF OTHER DISEASES OF THE MUSCULOSKELETAL SYSTEM AND CONNECTIVE TISSUE: ICD-10-CM

## 2024-05-09 LAB
ALBUMIN SERPL ELPH-MCNC: 4.4 G/DL
ALP BLD-CCNC: 66 U/L
ALT SERPL-CCNC: 12 U/L
ANION GAP SERPL CALC-SCNC: 12 MMOL/L
AST SERPL-CCNC: 17 U/L
BASOPHILS # BLD AUTO: 0.03 K/UL
BASOPHILS NFR BLD AUTO: 0.4 %
BILIRUB SERPL-MCNC: 0.8 MG/DL
BUN SERPL-MCNC: 20 MG/DL
CALCIUM SERPL-MCNC: 9.4 MG/DL
CHLORIDE SERPL-SCNC: 102 MMOL/L
CO2 SERPL-SCNC: 26 MMOL/L
CREAT SERPL-MCNC: 1.06 MG/DL
EGFR: 75 ML/MIN/1.73M2
EOSINOPHIL # BLD AUTO: 0.15 K/UL
EOSINOPHIL NFR BLD AUTO: 1.8 %
GLUCOSE SERPL-MCNC: 121 MG/DL
HCT VFR BLD CALC: 36 %
HGB BLD-MCNC: 12.9 G/DL
IMM GRANULOCYTES NFR BLD AUTO: 0.7 %
LYMPHOCYTES # BLD AUTO: 1.7 K/UL
LYMPHOCYTES NFR BLD AUTO: 20 %
MAN DIFF?: NORMAL
MCHC RBC-ENTMCNC: 33.6 PG
MCHC RBC-ENTMCNC: 35.8 GM/DL
MCV RBC AUTO: 93.8 FL
MONOCYTES # BLD AUTO: 0.89 K/UL
MONOCYTES NFR BLD AUTO: 10.5 %
NEUTROPHILS # BLD AUTO: 5.67 K/UL
NEUTROPHILS NFR BLD AUTO: 66.6 %
PLATELET # BLD AUTO: 191 K/UL
POTASSIUM SERPL-SCNC: 3.9 MMOL/L
PROT SERPL-MCNC: 6.7 G/DL
RBC # BLD: 3.84 M/UL
RBC # FLD: 13.4 %
SODIUM SERPL-SCNC: 140 MMOL/L
WBC # FLD AUTO: 8.5 K/UL

## 2024-05-09 RX ORDER — HYDROCODONE BITARTRATE AND ACETAMINOPHEN 7.5; 325 MG/15ML; MG/15ML
1 SOLUTION ORAL
Qty: 0 | Refills: 0 | DISCHARGE

## 2024-05-09 RX ORDER — SODIUM CHLORIDE 9 MG/ML
1000 INJECTION, SOLUTION INTRAVENOUS
Refills: 0 | Status: DISCONTINUED | OUTPATIENT
Start: 2024-05-13 | End: 2024-05-13

## 2024-05-09 NOTE — H&P PST ADULT - HISTORY OF PRESENT ILLNESS
74 year old male, presents to PST, with pre oip diagnosis left quadreceps tendon rupture 74 year old male, presents to [facility], with pre oip diagnosis left quadreceps tendon rupture and patella fracture, for pre op evaluation prior to scheduled surgery- left knee quadriceps tendon repair with Dr Marie. Patient reports she had mechanical fall on 05/5/24 went to [facility] ER- diagnosed with left knee patella fracture and quadriceps tendon rupture.

## 2024-05-09 NOTE — ASSESSMENT
[Patient Optimized for Surgery] : Patient optimized for surgery [FreeTextEntry4] : pulmonary consult reviewed and appreciated  can use to apply lidocaine patch once daily to L. knee (12 hours on, 12 hours off)  to ask rheumatology when to resume Humira post surger - last dose 2 weeks ago   [FreeTextEntry7] : avoids nsaids and Mvi 1 week before surgery

## 2024-05-09 NOTE — H&P PST ADULT - NSICDXPASTMEDICALHX_GEN_ALL_CORE_FT
PAST MEDICAL HISTORY:  Asthma     Cholangiocarcinoma     COPD, mild     Hand pain, left wrist and thumb    Hepatitis C Resolved after Harvoni treatment    History of hepatitis C     HTN (hypertension)     Insomnia     Pigmented skin lesion     Rheumatoid arthritis      PAST MEDICAL HISTORY:  Asthma     Cholangiocarcinoma     Chronic narcotic dependence     COPD, mild     H/O thrombocytopenia     Hand pain, left wrist and thumb    Hepatitis C Resolved after Harvoni treatment    History of hepatitis C     HTN (hypertension)     Insomnia     Pigmented skin lesion     Rheumatoid arthritis      PAST MEDICAL HISTORY:  Asthma     Cholangiocarcinoma     Chronic narcotic dependence     COPD, mild     H/O thrombocytopenia     Hand pain, left wrist and thumb    History of hepatitis C     HTN (hypertension)     Insomnia     Pigmented skin lesion     Rheumatoid arthritis

## 2024-05-09 NOTE — H&P PST ADULT - MUSCULOSKELETAL COMMENTS
pre op diagnosis-left knee patella fracture/ left knee quadriceps tendon rupture RA on humira- follows up with rheumatologist on standing hydrocodone, s/p fall 05/5/24- went to [facility] ED- diagnosed with patella rupture/ left knee quadriceps tendon rupture

## 2024-05-09 NOTE — H&P PST ADULT - RESPIRATORY AND THORAX COMMENTS
patient reports he is diagnosed with mild asthma with COPD- intermittent use of inhaler, follows up with pulmonologist

## 2024-05-09 NOTE — H&P PST ADULT - PROBLEM SELECTOR PLAN 3
Patient reports, he was diagnosed with HTN many years ago was on lisinopril-he self discontinued as his BP is normal  EKG in chart

## 2024-05-09 NOTE — H&P PST ADULT - NSICDXPASTSURGICALHX_GEN_ALL_CORE_FT
PAST SURGICAL HISTORY:  H/O colonoscopy     History of hand surgery left, 2017 ORIF wrist    S/P tonsillectomy

## 2024-05-09 NOTE — H&P PST ADULT - CARDIOVASCULAR COMMENTS
HTN was prescribed lisinopril, patient reports he stopped lisinopril- 9 months ago, coz BP was stable with diet control

## 2024-05-09 NOTE — H&P PST ADULT - PROBLEM SELECTOR PLAN 1
Patient is tentatively scheduled for left knee quadriceps tendon repair with Dr Frank negro 05/13/2024.    Pre-op instructions provided. Pt given verbal and written instructions with teach back on chlorhexidine wash and pepcid. Pt verbalized understanding with return demonstration.    Recent CBC, CMP results in chart- 05/8/24.

## 2024-05-09 NOTE — H&P PST ADULT - MUSCULOSKELETAL
left knee joint pain/no calf tenderness/decreased ROM due to pain/joint swelling/joint erythema/strength 5/5 bilateral upper extremities/abnormal gait details…

## 2024-05-09 NOTE — HISTORY OF PRESENT ILLNESS
[(Patient denies any chest pain, claudication, dyspnea on exertion, orthopnea, palpitations or syncope)] : Patient denies any chest pain, claudication, dyspnea on exertion, orthopnea, palpitations or syncope [Excellent (>10 METs)] : Excellent (>10 METs) [Spouse] : spouse [Self] : no previous adverse anesthesia reaction [FreeTextEntry2] : 5/13/24 [FreeTextEntry4] :   here for preop L. knee surgery   [facility] FAX: [phone]   h/o of cholangiocarcinoma s/p ablation 2021

## 2024-05-09 NOTE — H&P PST ADULT - HEMATOLOGY/LYMPHATICS COMMENTS
patient denies history of thrombocytopenia patient denies history of thrombocytopenia- recent platelets WNL

## 2024-05-09 NOTE — H&P PST ADULT - GASTROINTESTINAL COMMENTS
patient had incidental finding of liver lesions during routine ct chest as per pulmonology and later diagnosed with cholangiocarcinoma-- 02/2024-  s/p IR ablation- 02/2024

## 2024-05-09 NOTE — H&P PST ADULT - PROBLEM SELECTOR PLAN 5
Patient with history of RA - on Humira- also on chronic use of narcotic- hydrocodone/acetaminophen- for last 8 years ( generalized pain)    Patient instructed to take hydrocodone with a sip of water on the morning of procedure.   Emailed pain management

## 2024-05-09 NOTE — H&P PST ADULT - PROBLEM SELECTOR PLAN 2
Patient with PMH of asthma/COPD- on Trelegy inhaler  recently followed up with pulmonologist for pre op evaluation.    PFT reports in chart

## 2024-05-09 NOTE — H&P PST ADULT - ATTENDING COMMENTS
Carlos Bocanegra is a 74-year-old male, past medical history of Rheumatoid Arthritis, Sally, Insomnia, and Cholangiocarcinoma, who presented to the office for evaluation of his left knee pain. On 5/5/2024, patient slipped outside his home and fell backwards. He felt his body pushed against his leg and heard a pop. He went to the emergency department was found to have a proximal pole of the patella fracture. Patient is unable to straight leg raise. There was no reported head strike. XRays and MRI showed a proximal pole of the patella fracture with proximal retraction of the quadriceps tendon. Examination showed that patient was unable to straight leg raise. Patient was indicated for a Left Knee Quadriceps Tendon Repair, Possible Partial Patellectomy, Possible Left Patella Open Reduction, Internal Fixation. He was cleared by Medicine and Pulmonology.    Discussed with the patient the nature of quadriceps tendon ruptures and patella frractures. Discussed the operative and nonoperative management of quadriceps tendon ruptures and patella fractures. Discussed that nonoperative management would consist of bracing and activity modifications. Discussed the risks of nonoperative management including, but not limited to, difficulty with knee extension, extensor lag, and difficulty with ambulation. Discussed that operative management would consist of Left Knee Quadriceps Tendon Repair, Possible Partial Patellectomy, Possible Left Patella Open Reduction, Internal Fixation. Discussed the surgical procedure at length, including procedure, implants/sutures, possible patellectomy,, hospital course, postoperative recovery, physical therapy, bracing, range of motion restrictions, DVT prophylaxis, and risks. Discussed the risks of operative management including, but not limited to, risks of anesthesia, heart attack, stroke, death, injuries to nerves and vessels, extensor lag, failure of repair, failure of fixation, nonunion, malunion, future osteoarthritis, failure of tendon to heal, continued pain, stiffness, fractures, and infections.      Following discussion, patient elected to proceed with Left Knee Quadriceps Tendon Repair, Possible Partial Patellectomy, Possible Left Patella Open Reduction, Internal Fixation. Informed consent was obtained. Patient's leg was then marked with verbal confirmation of the patient. Patient was understanding and in agreement with the operative plan. All questions were answered.?     Assessment/Plan:  Carlos Bocanegra is a 74 year old male who presented for a Left Knee Quadriceps Tendon Repair, Possible Partial Patellectomy, Possible Left Patella Open Reduction, Internal Fixation    Plan:  -Left Knee Quadriceps Tendon Repair, Possible Partial Patellectomy, Possible Left Patella Open Reduction, Internal Fixation  -Clearance in Chart

## 2024-05-10 ENCOUNTER — NON-APPOINTMENT (OUTPATIENT)
Age: 74
End: 2024-05-10

## 2024-05-10 ENCOUNTER — OUTPATIENT (OUTPATIENT)
Dept: OUTPATIENT SERVICES | Facility: HOSPITAL | Age: 74
LOS: 1 days | End: 2024-05-10
Payer: MEDICARE

## 2024-05-10 ENCOUNTER — APPOINTMENT (OUTPATIENT)
Dept: MRI IMAGING | Facility: HOSPITAL | Age: 74
End: 2024-05-10
Payer: MEDICARE

## 2024-05-10 DIAGNOSIS — Z98.890 OTHER SPECIFIED POSTPROCEDURAL STATES: Chronic | ICD-10-CM

## 2024-05-10 DIAGNOSIS — Z90.89 ACQUIRED ABSENCE OF OTHER ORGANS: Chronic | ICD-10-CM

## 2024-05-10 DIAGNOSIS — S82.009A UNSPECIFIED FRACTURE OF UNSPECIFIED PATELLA, INITIAL ENCOUNTER FOR CLOSED FRACTURE: ICD-10-CM

## 2024-05-10 PROBLEM — C22.1 INTRAHEPATIC BILE DUCT CARCINOMA: Chronic | Status: ACTIVE | Noted: 2024-05-09

## 2024-05-10 PROBLEM — Z86.2 PERSONAL HISTORY OF DISEASES OF THE BLOOD AND BLOOD-FORMING ORGANS AND CERTAIN DISORDERS INVOLVING THE IMMUNE MECHANISM: Chronic | Status: ACTIVE | Noted: 2024-05-09

## 2024-05-10 PROBLEM — J44.9 CHRONIC OBSTRUCTIVE PULMONARY DISEASE, UNSPECIFIED: Chronic | Status: ACTIVE | Noted: 2024-05-09

## 2024-05-10 PROBLEM — Z86.19 PERSONAL HISTORY OF OTHER INFECTIOUS AND PARASITIC DISEASES: Chronic | Status: ACTIVE | Noted: 2024-05-09

## 2024-05-10 PROBLEM — F11.20 OPIOID DEPENDENCE, UNCOMPLICATED: Chronic | Status: ACTIVE | Noted: 2024-05-09

## 2024-05-10 PROBLEM — J45.909 UNSPECIFIED ASTHMA, UNCOMPLICATED: Chronic | Status: ACTIVE | Noted: 2024-05-09

## 2024-05-10 PROCEDURE — 73721 MRI JNT OF LWR EXTRE W/O DYE: CPT | Mod: 26,LT,MH

## 2024-05-10 PROCEDURE — 73721 MRI JNT OF LWR EXTRE W/O DYE: CPT

## 2024-05-10 NOTE — ASU PATIENT PROFILE, ADULT - FALL HARM RISK - HARM RISK INTERVENTIONS

## 2024-05-10 NOTE — ASU PATIENT PROFILE, ADULT - AS SC BRADEN MOBILITY
Called and LM for patient to call back. Please relay message below.     Alexandra Sosa MA    (4) no limitation

## 2024-05-12 ENCOUNTER — TRANSCRIPTION ENCOUNTER (OUTPATIENT)
Age: 74
End: 2024-05-12

## 2024-05-12 NOTE — CHART NOTE - NSCHARTNOTEFT_GEN_A_CORE
Pt is a 75 y/o male presented to ED for fall.  As per [facility], pt has scheduled Ortho appt with Jesus Duenas on 5/13/24 and Internal Medicine appt with Savana Reina on 5/8/24.

## 2024-05-13 ENCOUNTER — APPOINTMENT (OUTPATIENT)
Dept: ORTHOPEDIC SURGERY | Facility: HOSPITAL | Age: 74
End: 2024-05-13

## 2024-05-13 ENCOUNTER — TRANSCRIPTION ENCOUNTER (OUTPATIENT)
Age: 74
End: 2024-05-13

## 2024-05-13 ENCOUNTER — OUTPATIENT (OUTPATIENT)
Dept: INPATIENT UNIT | Facility: HOSPITAL | Age: 74
LOS: 1 days | Discharge: HOME | End: 2024-05-13

## 2024-05-13 VITALS
RESPIRATION RATE: 12 BRPM | HEART RATE: 75 BPM | SYSTOLIC BLOOD PRESSURE: 142 MMHG | DIASTOLIC BLOOD PRESSURE: 69 MMHG | OXYGEN SATURATION: 94 %

## 2024-05-13 VITALS
RESPIRATION RATE: 18 BRPM | OXYGEN SATURATION: 100 % | TEMPERATURE: 98 F | HEIGHT: 69 IN | HEART RATE: 73 BPM | SYSTOLIC BLOOD PRESSURE: 137 MMHG | DIASTOLIC BLOOD PRESSURE: 77 MMHG | WEIGHT: 156.09 LBS

## 2024-05-13 DIAGNOSIS — Z98.890 OTHER SPECIFIED POSTPROCEDURAL STATES: Chronic | ICD-10-CM

## 2024-05-13 DIAGNOSIS — S76.101A UNSPECIFIED INJURY OF RIGHT QUADRICEPS MUSCLE, FASCIA AND TENDON, INITIAL ENCOUNTER: ICD-10-CM

## 2024-05-13 DIAGNOSIS — Z90.89 ACQUIRED ABSENCE OF OTHER ORGANS: Chronic | ICD-10-CM

## 2024-05-13 DEVICE — IMPLANTABLE DEVICE: Type: IMPLANTABLE DEVICE | Site: LEFT | Status: FUNCTIONAL

## 2024-05-13 DEVICE — K-WIRE STRYKER (SMOOTH) 4MM: Type: IMPLANTABLE DEVICE | Site: LEFT | Status: FUNCTIONAL

## 2024-05-13 RX ORDER — ADALIMUMAB 40MG/0.8ML
0 KIT SUBCUTANEOUS
Qty: 0 | Refills: 0 | DISCHARGE

## 2024-05-13 RX ORDER — OXYCODONE HYDROCHLORIDE 5 MG/1
1 TABLET ORAL
Qty: 30 | Refills: 0
Start: 2024-05-13 | End: 2024-05-17

## 2024-05-13 RX ORDER — HYDROMORPHONE HYDROCHLORIDE 2 MG/ML
0.5 INJECTION INTRAMUSCULAR; INTRAVENOUS; SUBCUTANEOUS
Refills: 0 | Status: DISCONTINUED | OUTPATIENT
Start: 2024-05-13 | End: 2024-05-13

## 2024-05-13 RX ORDER — ONDANSETRON 8 MG/1
4 TABLET, FILM COATED ORAL ONCE
Refills: 0 | Status: DISCONTINUED | OUTPATIENT
Start: 2024-05-13 | End: 2024-05-13

## 2024-05-13 RX ORDER — HYDROCODONE BITARTRATE AND ACETAMINOPHEN 7.5; 325 MG/15ML; MG/15ML
1 SOLUTION ORAL
Refills: 0 | DISCHARGE

## 2024-05-13 RX ORDER — NALOXONE HYDROCHLORIDE 4 MG/.1ML
1 SPRAY NASAL
Qty: 1 | Refills: 0
Start: 2024-05-13 | End: 2024-05-13

## 2024-05-13 RX ORDER — FLUTICASONE FUROATE, UMECLIDINIUM BROMIDE AND VILANTEROL TRIFENATATE 200; 62.5; 25 UG/1; UG/1; UG/1
1 POWDER RESPIRATORY (INHALATION)
Refills: 0 | DISCHARGE

## 2024-05-13 RX ORDER — ASPIRIN/CALCIUM CARB/MAGNESIUM 324 MG
1 TABLET ORAL
Qty: 90 | Refills: 0
Start: 2024-05-13 | End: 2024-06-26

## 2024-05-13 NOTE — ASU DISCHARGE PLAN (ADULT/PEDIATRIC) - NS MD DC FALL RISK RISK
For information on Fall & Injury Prevention, visit: [email or web address] OR  [email or web address] OR  https://www.cdc.gov/steadi/patient.html

## 2024-05-13 NOTE — ASU DISCHARGE PLAN (ADULT/PEDIATRIC) - ASU DC SPECIAL INSTRUCTIONSFT
DEEP VEIN THROMBOSIS PROPHYLAXIS: Please take Aspirin 325 twice daily as prevention for blood clots for 6 weeks (prescription sent to pharmacy).    WOUND CARE: Do not remove dressing until follow up. Keep dressing/incision clean, dry, and intact.    BATHING: Please do not submerge wound underwater. You may shower and/or sponge bathe. Do not scrub incisions or apply lotions.    ACTIVITY: Your weight bearing status is weight bearing as tolerated in knee immobilizer. If you are taking narcotic pain medication (such as oxycodone), do NOT drive a car, operate machinery or make important decisions.    DIET: Return to your usual diet.    NOTIFY YOUR SURGEON IF: You have any bleeding that does not stop, any pus draining from your wound, any fever (over 100.4 F) or chills, persistent nausea/vomiting, persistent diarrhea, or if your pain is not controlled on your discharge pain medications.    PAIN CONTROL: Please take medication as prescribed. If you have been prescribed a narcotic (such as oxycodone), please be aware that narcotic pain medicine can cause extreme nausea and constipation. Drink plenty of water and take stool softeners (Colace, Miralax) as needed. You can get them from your local pharmacy. If you have been prescribed a narcotic (such as oxycodone), please take for severe pain only. You can take up to 8 Tylenol 325 mg a day while taking oxycodone. Alternate between taking over the counter Ibuprofen and Tylenol so you are taking pain medication every 3-4 hours if your pain is severe.    FOLLOW-UP:  1. Follow-up with Dr. Marie within 1-2 weeks of discharge.  Please call office for appointment DEEP VEIN THROMBOSIS PROPHYLAXIS: Please take Aspirin 325 twice daily as prevention for blood clots for 6 weeks (prescription sent to pharmacy).    WOUND CARE: Do not remove dressing until follow up. Keep dressing/incision clean, dry, and intact.    BATHING: Please do not submerge wound underwater. You may shower and/or sponge bathe. Do not scrub incisions or apply lotions.    ACTIVITY: Your weight bearing status is weight bearing as tolerated in knee immobilizer. If you are taking narcotic pain medication (such as oxycodone), do NOT drive a car, operate machinery or make important decisions.    DIET: Return to your usual diet.    NOTIFY YOUR SURGEON IF: You have any bleeding that does not stop, any pus draining from your wound, any fever (over 100.4 F) or chills, persistent nausea/vomiting, persistent diarrhea, or if your pain is not controlled on your discharge pain medications.    PAIN CONTROL: Please take medication as prescribed. If you have been prescribed a narcotic (such as oxycodone), please be aware that narcotic pain medicine can cause extreme nausea and constipation. Drink plenty of water and take stool softeners (Colace, Miralax) as needed. You can get them from your local pharmacy. If you have been prescribed a narcotic (such as oxycodone), please take for severe pain only. You can take up to 8 Tylenol 325 mg a day while taking oxycodone. Alternate between taking over the counter Ibuprofen and Tylenol so you are taking pain medication every 3-4 hours if your pain is severe.    Restart your pain management regimen after completing the oxycodone, do not take tylenol while taking hydrocodone-tylenol    FOLLOW-UP:  1. Follow-up with Dr. Marie within 1-2 weeks of discharge.  Please call office for appointment

## 2024-05-13 NOTE — ASU DISCHARGE PLAN (ADULT/PEDIATRIC) - DISCHARGE TO
RECD PT IN BED, NO ACUTE DISTRESS NOTED. V/S TAKEN AND RECORDED.RESTED FAIRLY WELL. NO 
COMPLAINTS PRESENTED. Home

## 2024-05-13 NOTE — ASU DISCHARGE PLAN (ADULT/PEDIATRIC) - CARE PROVIDER_API CALL
Jesus Marie  Orthopaedic Surgery  [address] [place] [address], [address]  [place] [address]  Phone: [phone]  Fax: [phone]  Follow Up Time: 1 week

## 2024-05-13 NOTE — ASU DISCHARGE PLAN (ADULT/PEDIATRIC) - MEDICATION INSTRUCTIONS
Alternate Tylenol 650MG (2 REGULAR STRENGTH) AND Motrin 400MG (2 REGULAR STRENGTH) EVERY 3 hours making sure that each dose of Tylenol is 6 hours from previous tylenol dose and each dose of motrin is 6 hours from previous motrin dose.  The  first dose of MOTRIN/IBUPROFEN can be no earlier than 430PM and the first dose of TYLENOL/ACETAMINOPHEN INCLUDING PERCOCET/VICODIN AND COLD MEDICINE can be no earlier than 2PM. The maximum amount of daily Tylenol is 4000MG. Please keep that in mind.

## 2024-05-15 ENCOUNTER — NON-APPOINTMENT (OUTPATIENT)
Age: 74
End: 2024-05-15

## 2024-05-20 ENCOUNTER — NON-APPOINTMENT (OUTPATIENT)
Age: 74
End: 2024-05-20

## 2024-05-22 ENCOUNTER — NON-APPOINTMENT (OUTPATIENT)
Age: 74
End: 2024-05-22

## 2024-05-28 ENCOUNTER — APPOINTMENT (OUTPATIENT)
Dept: ORTHOPEDIC SURGERY | Facility: CLINIC | Age: 74
End: 2024-05-28
Payer: MEDICARE

## 2024-05-28 PROCEDURE — 99024 POSTOP FOLLOW-UP VISIT: CPT

## 2024-05-28 PROCEDURE — 73560 X-RAY EXAM OF KNEE 1 OR 2: CPT | Mod: LT

## 2024-05-28 RX ORDER — OXYCODONE 5 MG/1
5 TABLET ORAL EVERY 6 HOURS
Qty: 20 | Refills: 0 | Status: ACTIVE | COMMUNITY
Start: 2024-05-28 | End: 1900-01-01

## 2024-05-28 NOTE — DISCUSSION/SUMMARY
[de-identified] : Carlos Bocanegra is a 74-year-old male who presents to the office for follow-up of his left patella ORIF and quadriceps tendon repair.  Patient underwent surgery on 5/13/2024.  He is currently doing well overall.  X-rays showed patella ORIF without obvious hardware complications or failure.  Examination showed incision well-healing.  Discussed with patient the examination and imaging findings.  Discussed with patient the recovery from patella ORIF, including immobilization, physical therapy, and DVT prophylaxis.  Patient will continue to be weightbearing as tolerated on the left lower extremity in a [place] locked in extension.  Locked brace in extension.  Patient was given referral to physical therapy.  He will continue take his aspirin 325 mg twice per day for total of 6 weeks.  Patient was given prescription for oxycodone 5 mg every 6 hours as needed for 5 days.  Patient will follow-up in 2 weeks for reevaluation and management.  Patient understanding and in agreement with the plan.  All questions answered.   Plan: -Left lower extremity: Weightbearing as tolerated in [place] brace locked in extension -DVT prophylaxis: Aspirin 325 mg twice per day for total of 6 weeks -Pain control: Oxycodone 5 mg every 6 hours as needed for 5 days (HCS: [id number]) -Physical therapy -Follow-up in 2 weeks for reevaluation and management

## 2024-05-28 NOTE — PHYSICAL EXAM
[de-identified] : Constitutional:  74 year old male, alert and oriented, cooperative, in no acute distress.  HEENT  NC/AT.  Appearance: symmetric  Neck/Back Straight without deformity or instability.  Good ROM.  Chest/Respiratory  Respiratory effort: no intercostal retractions or use of accessory muscles. Nonlabored Breathing  Skin  On inspection, warm and dry without rashes or lesions.  Mental Status:  Judgment, insight: intact Orientation: oriented to time, place, and person  Neurological: Sensory and Motor are grossly intact throughout  Left Knee  Inspection:     Incision well healing. No erythema or drainage     Moderate Effusion  Range of Motion: 	Extension - 0 degrees, able to SLR in brace, not out of brace 	Flexion - 60 degrees  Neurologic Exam     Motor intact including 5/5 Extensor Hallucis Longus, 5/5 Flexor Hallucis Longus, 5/5 Tibialis Anterior and 5/5 Gastrocnemius     Sensation Intact to Light Touch including Saphenous, Sural, Superficial Peroneal, Deep Peroneal, Tibial nerve distributions  Vascular Exam     Foot is warm and well perfused with 2+ Dorsalis Pedis Pulse  [de-identified] : XRay:  XRays of the Left Knee (3 Views) taken in the office today and reviewed with the patient. XRays demonstrate a Left knee patella ORIF. There has been no displacement of the fracture. There is no evidence of hardware loosening or failure. (my personal interpretation) Components: [place] Cannulated Screws

## 2024-05-28 NOTE — HISTORY OF PRESENT ILLNESS
[de-identified] : 5/28/2024  Carlos Bocanegra presents to the office for follow-up of his left knee patella ORIF and quadriceps tendon repair.  Patient underwent surgery on 5/13/2024.  He is currently doing well overall.  Patient does have some pain and has run out of his pain medications.  He has been using his [place] brace.  No falls.  No fevers or chills.  [place] brace was not locked in extension.  He has been taking his aspirin.  5/7/2024 Carlos Bocanegra is a 74-year-old male who presented to the office for evaluation of his left knee pain.  On Sunday, patient slipped outside his home and fell backwards.  He felt his body pushed against his leg and heard a pop.  He went to the emergency department was found to have a proximal pole of the patella fracture.  Patient was given pain medications and knee immobilizer.  He returned to the ER yesterday.  He continues to have knee pain.  Patient is unable to straight leg raise.  There was no reported head strike.  No fevers or chills.  No hip pain.  No back pain.  History: Rheumatoid Arthritis, Asthma, Insomnia, [place] [facility] [place]

## 2024-06-11 ENCOUNTER — APPOINTMENT (OUTPATIENT)
Dept: ORTHOPEDIC SURGERY | Facility: CLINIC | Age: 74
End: 2024-06-11
Payer: MEDICARE

## 2024-06-11 DIAGNOSIS — S82.009A UNSPECIFIED FRACTURE OF UNSPECIFIED PATELLA, INITIAL ENCOUNTER FOR CLOSED FRACTURE: ICD-10-CM

## 2024-06-11 PROCEDURE — 99024 POSTOP FOLLOW-UP VISIT: CPT

## 2024-06-11 PROCEDURE — 73560 X-RAY EXAM OF KNEE 1 OR 2: CPT | Mod: LT

## 2024-06-11 NOTE — DISCUSSION/SUMMARY
[de-identified] : Carlos Bocanegra is a 74-year-old male who presents to the office for follow-up of his left patella ORIF and quadriceps tendon repair.  Patient underwent surgery on 5/13/2024.  He is currently doing well overall.  X-rays showed patella ORIF without obvious hardware complications or failure.  Examination showed incision well-healing.  Discussed with patient the examination and imaging findings.  Discussed with patient the recovery from patella ORIF, including immobilization, physical therapy, and DVT prophylaxis.  Patient will continue to be weightbearing as tolerated on the left lower extremity in a [place] locked in extension.  Discussed that on 6/17/24, patient will unlock brace to 0-30 degrees. He will then increase flexion by 10 degrees per week.  Patient was given a referral to physical therapy.  He will continue take his aspirin 325 mg twice per day for total of 6 weeks.  Patient will follow-up in 4 weeks for reevaluation and management.  Patient understanding and in agreement with the plan.  All questions answered.   Plan: -Left lower extremity: Weightbearing as tolerated in [place] brace locked in extension -Starting 6/17/2024, ROM: 0-30 degrees, then increase by 10 degrees per week -DVT prophylaxis: Aspirin 325 mg twice per day for total of 6 weeks -Physical therapy -Follow-up in 4 weeks for reevaluation and management

## 2024-06-11 NOTE — PHYSICAL EXAM
[de-identified] : Constitutional:  74 year old male, alert and oriented, cooperative, in no acute distress.  HEENT  NC/AT.  Appearance: symmetric  Neck/Back Straight without deformity or instability.  Good ROM.  Chest/Respiratory  Respiratory effort: no intercostal retractions or use of accessory muscles. Nonlabored Breathing  Skin  On inspection, warm and dry without rashes or lesions.  Mental Status:  Judgment, insight: intact Orientation: oriented to time, place, and person  Neurological: Sensory and Motor are grossly intact throughout  Left Knee  Inspection:     Incision well healing. No erythema or drainage     Moderate Effusion    Able to SLR with no extensor lag  Range of Motion: 	Extension - 0 degrees 	Flexion - 90 degrees  Neurologic Exam     Motor intact including 5/5 Extensor Hallucis Longus, 5/5 Flexor Hallucis Longus, 5/5 Tibialis Anterior and 5/5 Gastrocnemius     Sensation Intact to Light Touch including Saphenous, Sural, Superficial Peroneal, Deep Peroneal, Tibial nerve distributions  Vascular Exam     Foot is warm and well perfused with 2+ Dorsalis Pedis Pulse  [de-identified] : XRay:  XRays of the Left Knee (2 Views) taken in the office today and reviewed with the patient. XRays demonstrate a Left knee patella ORIF. There has been no displacement of the fracture. There is no evidence of hardware loosening or failure. (my personal interpretation) Components: [place] Cannulated Screws

## 2024-06-11 NOTE — HISTORY OF PRESENT ILLNESS
[de-identified] : 6/11/2024  Carlos Bocanegra presents to the office for follow-up of his left patella ORIF and quadriceps tendon repair.  Patient underwent surgery on 5/13/2024.  He is currently doing well overall.  Patient is able to straight leg raise.  He has been doing the range of motion in physical therapy and has gone past 90 degrees.  No falls.  No fevers or chills.  5/28/2024  Carlos Bocanegra presents to the office for follow-up of his left knee patella ORIF and quadriceps tendon repair.  Patient underwent surgery on 5/13/2024.  He is currently doing well overall.  Patient does have some pain and has run out of his pain medications.  He has been using his [place] brace.  No falls.  No fevers or chills.  [place] brace was not locked in extension.  He has been taking his aspirin.  5/7/2024 Carlos Bocanegra is a 74-year-old male who presented to the office for evaluation of his left knee pain.  On Sunday, patient slipped outside his home and fell backwards.  He felt his body pushed against his leg and heard a pop.  He went to the emergency department was found to have a proximal pole of the patella fracture.  Patient was given pain medications and knee immobilizer.  He returned to the ER yesterday.  He continues to have knee pain.  Patient is unable to straight leg raise.  There was no reported head strike.  No fevers or chills.  No hip pain.  No back pain.  History: Rheumatoid Arthritis, Asthma, Insomnia, [place] [facility] [place]

## 2024-06-25 RX ORDER — ZOLPIDEM TARTRATE 10 MG/1
10 TABLET ORAL
Qty: 30 | Refills: 0 | Status: ACTIVE | COMMUNITY
Start: 2021-05-03 | End: 1900-01-01

## 2024-07-09 ENCOUNTER — APPOINTMENT (OUTPATIENT)
Dept: ORTHOPEDIC SURGERY | Facility: CLINIC | Age: 74
End: 2024-07-09
Payer: MEDICARE

## 2024-07-09 DIAGNOSIS — S82.009A UNSPECIFIED FRACTURE OF UNSPECIFIED PATELLA, INITIAL ENCOUNTER FOR CLOSED FRACTURE: ICD-10-CM

## 2024-07-09 PROCEDURE — 73560 X-RAY EXAM OF KNEE 1 OR 2: CPT | Mod: LT

## 2024-07-09 PROCEDURE — 99024 POSTOP FOLLOW-UP VISIT: CPT

## 2024-07-24 ENCOUNTER — NON-APPOINTMENT (OUTPATIENT)
Age: 74
End: 2024-07-24

## 2024-08-19 NOTE — DISCUSSION/SUMMARY
[de-identified] : Carlos Bocanegra is a 74-year-old male who presents to the office for follow-up of his left patella ORIF and quadriceps tendon repair.  Patient underwent surgery on 5/13/2024.  He is currently doing well overall.  X-rays showed patella ORIF without obvious hardware complications or failure.  Examination showed incision well-healing.  Discussed with patient the examination and imaging findings.  Discussed with patient the recovery from patella ORIF, including immobilization, physical therapy, and DVT prophylaxis.  Patient will continue to be weightbearing as tolerated on the left lower extremity in a Discussed that patient will advance his ROM to 0-90 degrees for 2 weeks and then will be ROM as tolerated. Patient will follow-up in 6 weeks for reevaluation and management.  Patient understanding and in agreement with the plan.  All questions answered.   Plan: -Left lower extremity: Weightbearing as tolerated -ROM: 0-90 degrees for 2 weeks (until 7/22/24) then ROM as tolerated -Physical therapy -Follow-up in 6 weeks for reevaluation and management

## 2024-08-19 NOTE — HISTORY OF PRESENT ILLNESS
- de-identified] : 8/21/2024 7/9/2024  Carlos Bocanegar presents to the office for follow-up of his left patella ORIF and quadriceps tendon repair.  Patient underwent surgery on 5/13/2024.  He is currently doing well overall.  He is able to straight leg raise.  He has been improving his range of motion with physical therapy.  No falls.  No fevers or chills.  6/11/2024  Carlos Bocanegra presents to the office for follow-up of his left patella ORIF and quadriceps tendon repair.  Patient underwent surgery on 5/13/2024.  He is currently doing well overall.  Patient is able to straight leg raise.  He has been doing the range of motion in physical therapy and has gone past 90 degrees.  No falls.  No fevers or chills.  5/28/2024  Carlos Bocanegra presents to the office for follow-up of his left knee patella ORIF and quadriceps tendon repair.  Patient underwent surgery on 5/13/2024.  He is currently doing well overall.  Patient does have some pain and has run out of his pain medications.  He has been using his [place] brace.  No falls.  No fevers or chills.  [place] brace was not locked in extension.  He has been taking his aspirin.  5/7/2024 Carlos Bocanegra is a 74-year-old male who presented to the office for evaluation of his left knee pain.  On Sunday, patient slipped outside his home and fell backwards.  He felt his body pushed against his leg and heard a pop.  He went to the emergency department was found to have a proximal pole of the patella fracture.  Patient was given pain medications and knee immobilizer.  He returned to the ER yesterday.  He continues to have knee pain.  Patient is unable to straight leg raise.  There was no reported head strike.  No fevers or chills.  No hip pain.  No back pain.  History: Rheumatoid Arthritis, Asthma, Insomnia, Cholangiocarcinoma s/p IR ablation in February at [facility] [place]

## 2024-08-19 NOTE — PHYSICAL EXAM
[de-identified] : Constitutional:  74 year old male, alert and oriented, cooperative, in no acute distress.  HEENT  NC/AT.  Appearance: symmetric  Neck/Back Straight without deformity or instability.  Good ROM.  Chest/Respiratory  Respiratory effort: no intercostal retractions or use of accessory muscles. Nonlabored Breathing  Skin  On inspection, warm and dry without rashes or lesions.  Mental Status:  Judgment, insight: intact Orientation: oriented to time, place, and person  Neurological: Sensory and Motor are grossly intact throughout  Left Knee  Inspection:     Incision well healing. No erythema or drainage     Moderate Effusion    Able to SLR with no extensor lag  Range of Motion: 	Extension - 0 degrees 	Flexion - 90 degrees  Neurologic Exam     Motor intact including 5/5 Extensor Hallucis Longus, 5/5 Flexor Hallucis Longus, 5/5 Tibialis Anterior and 5/5 Gastrocnemius     Sensation Intact to Light Touch including Saphenous, Sural, Superficial Peroneal, Deep Peroneal, Tibial nerve distributions  Vascular Exam     Foot is warm and well perfused with 2+ Dorsalis Pedis Pulse  [de-identified] : XRay:  XRays of the Left Knee (2 Views) taken in the office today and reviewed with the patient. XRays demonstrate a Left knee patella ORIF. There has been no displacement of the fracture. There is no evidence of hardware loosening or failure. (my personal interpretation) Components: [place] Cannulated Screws

## 2024-08-19 NOTE — HISTORY OF PRESENT ILLNESS
[de-identified] : 8/21/2024 7/9/2024  Carlos Bocanegra presents to the office for follow-up of his left patella ORIF and quadriceps tendon repair.  Patient underwent surgery on 5/13/2024.  He is currently doing well overall.  He is able to straight leg raise.  He has been improving his range of motion with physical therapy.  No falls.  No fevers or chills.  6/11/2024  Carlos Bocanegra presents to the office for follow-up of his left patella ORIF and quadriceps tendon repair.  Patient underwent surgery on 5/13/2024.  He is currently doing well overall.  Patient is able to straight leg raise.  He has been doing the range of motion in physical therapy and has gone past 90 degrees.  No falls.  No fevers or chills.  5/28/2024  Carlos Bocanegra presents to the office for follow-up of his left knee patella ORIF and quadriceps tendon repair.  Patient underwent surgery on 5/13/2024.  He is currently doing well overall.  Patient does have some pain and has run out of his pain medications.  He has been using his Nolan brace.  No falls.  No fevers or chills.  Nolan brace was not locked in extension.  He has been taking his aspirin.  5/7/2024 Carlos Bocanegra is a 74-year-old male who presented to the office for evaluation of his left knee pain.  On Sunday, patient slipped outside his home and fell backwards.  He felt his body pushed against his leg and heard a pop.  He went to the emergency department was found to have a proximal pole of the patella fracture.  Patient was given pain medications and knee immobilizer.  He returned to the ER yesterday.  He continues to have knee pain.  Patient is unable to straight leg raise.  There was no reported head strike.  No fevers or chills.  No hip pain.  No back pain.  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

## 2024-08-19 NOTE — PHYSICAL EXAM
[de-identified] : Constitutional:  74 year old male, alert and oriented, cooperative, in no acute distress.  HEENT  NC/AT.  Appearance: symmetric  Neck/Back Straight without deformity or instability.  Good ROM.  Chest/Respiratory  Respiratory effort: no intercostal retractions or use of accessory muscles. Nonlabored Breathing  Skin  On inspection, warm and dry without rashes or lesions.  Mental Status:  Judgment, insight: intact Orientation: oriented to time, place, and person  Neurological: Sensory and Motor are grossly intact throughout  Left Knee  Inspection:     Incision well healing. No erythema or drainage     Moderate Effusion    Able to SLR with no extensor lag  Range of Motion: 	Extension - 0 degrees 	Flexion - 90 degrees  Neurologic Exam     Motor intact including 5/5 Extensor Hallucis Longus, 5/5 Flexor Hallucis Longus, 5/5 Tibialis Anterior and 5/5 Gastrocnemius     Sensation Intact to Light Touch including Saphenous, Sural, Superficial Peroneal, Deep Peroneal, Tibial nerve distributions  Vascular Exam     Foot is warm and well perfused with 2+ Dorsalis Pedis Pulse  [de-identified] : XRay:  XRays of the Left Knee (2 Views) taken in the office today and reviewed with the patient. XRays demonstrate a Left knee patella ORIF. There has been no displacement of the fracture. There is no evidence of hardware loosening or failure. (my personal interpretation) Components: [place] Cannulated Screws

## 2024-08-19 NOTE — DISCUSSION/SUMMARY
[de-identified] : Carlos Bocanegra is a 74-year-old male who presents to the office for follow-up of his left patella ORIF and quadriceps tendon repair.  Patient underwent surgery on 5/13/2024.  He is currently doing well overall.  X-rays showed patella ORIF without obvious hardware complications or failure.  Examination showed incision well-healing.  Discussed with patient the examination and imaging findings.  Discussed with patient the recovery from patella ORIF, including immobilization, physical therapy, and DVT prophylaxis.  Patient will continue to be weightbearing as tolerated on the left lower extremity in a Discussed that patient will advance his ROM to 0-90 degrees for 2 weeks and then will be ROM as tolerated. Patient will follow-up in 6 weeks for reevaluation and management.  Patient understanding and in agreement with the plan.  All questions answered.   Plan: -Left lower extremity: Weightbearing as tolerated -ROM: 0-90 degrees for 2 weeks (until 7/22/24) then ROM as tolerated -Physical therapy -Follow-up in 6 weeks for reevaluation and management

## 2024-08-22 ENCOUNTER — APPOINTMENT (OUTPATIENT)
Dept: ORTHOPEDIC SURGERY | Facility: CLINIC | Age: 74
End: 2024-08-22
Payer: MEDICARE

## 2024-08-22 DIAGNOSIS — S82.009A UNSPECIFIED FRACTURE OF UNSPECIFIED PATELLA, INITIAL ENCOUNTER FOR CLOSED FRACTURE: ICD-10-CM

## 2024-08-22 PROCEDURE — 73560 X-RAY EXAM OF KNEE 1 OR 2: CPT | Mod: LT

## 2024-08-22 PROCEDURE — 99213 OFFICE O/P EST LOW 20 MIN: CPT

## 2024-08-31 ENCOUNTER — EMERGENCY (EMERGENCY)
Facility: HOSPITAL | Age: 74
LOS: 1 days | Discharge: LEFT BEFORE DISCHARGE | End: 2024-08-31
Attending: EMERGENCY MEDICINE | Admitting: EMERGENCY MEDICINE
Payer: MEDICARE

## 2024-08-31 VITALS
TEMPERATURE: 98 F | SYSTOLIC BLOOD PRESSURE: 167 MMHG | WEIGHT: 160.06 LBS | HEART RATE: 95 BPM | HEIGHT: 69 IN | OXYGEN SATURATION: 98 % | DIASTOLIC BLOOD PRESSURE: 89 MMHG | RESPIRATION RATE: 22 BRPM

## 2024-08-31 DIAGNOSIS — Z98.890 OTHER SPECIFIED POSTPROCEDURAL STATES: Chronic | ICD-10-CM

## 2024-08-31 DIAGNOSIS — Z90.89 ACQUIRED ABSENCE OF OTHER ORGANS: Chronic | ICD-10-CM

## 2024-08-31 PROCEDURE — 99282 EMERGENCY DEPT VISIT SF MDM: CPT

## 2024-08-31 PROCEDURE — 99283 EMERGENCY DEPT VISIT LOW MDM: CPT

## 2024-08-31 RX ORDER — LISINOPRIL 10 MG/1
0 TABLET ORAL
Refills: 0 | DISCHARGE

## 2024-08-31 RX ORDER — METHYLPREDNISOLONE 4 MG
0 TABLET ORAL
Refills: 0 | DISCHARGE

## 2024-08-31 NOTE — ED PROVIDER NOTE - PROGRESS NOTE DETAILS
Pt was not in his bed when Ultrasound came multiple times to take him to radiology. RN called pt who stated he left and went to [place] for eval by his doctor.   Andrea .

## 2024-08-31 NOTE — ED ADULT NURSE NOTE - NS ED NURSE ELOPE COMMENTS
patient left without announcing, this RN called patient whom reports he left without notifying anyone to go to [facility]

## 2024-08-31 NOTE — ED ADULT NURSE NOTE - NSICDXPASTMEDICALHX_GEN_ALL_CORE_FT
PAST MEDICAL HISTORY:  Asthma     Cholangiocarcinoma     Chronic narcotic dependence     COPD, mild     H/O thrombocytopenia     Hand pain, left wrist and thumb    History of hepatitis C     HTN (hypertension)     Insomnia     Pigmented skin lesion     Rheumatoid arthritis

## 2024-08-31 NOTE — ED ADULT NURSE NOTE - PAIN: PRESENCE, MLM
Pt BIB from wound care center for R buttock wound eval - wound for about 3 x months worse with no positive results despite treatment  - hx dm ankylosing spondylitis - pt in no acute distress pending md brown owens rn complains of pain/discomfort

## 2024-08-31 NOTE — ED ADULT NURSE NOTE - OBJECTIVE STATEMENT
pt arrives to ED with c/o sudden onset of left forearm pain, swelling. pt reports yesterday he had IR procedure prostate embolization (at [facility] by MD Griffin) which they went through the left forearm for the procedure. post procedure experienced internal bleeding in left forearm which was controlled with pressure, swelling decreased. pt states he went to [employer] and while there sudden onset of pain to left forearm, swelling, ecchymosis, pain and cap refill >3 sec to left forearm. +radial pulse present. pressure dressing applied per MD. no signs of external bleeding present.

## 2024-08-31 NOTE — ED PROVIDER NOTE - MUSCULOSKELETAL, MLM
There is moderate swelling of the left forearm but no bruising or bleeding.  There is no palpable cords.  There is no bony deformity or tenderness.  There is no joint swelling.  Full range of motion pulses and sensation are intact.  Strong left radial pulse noted.  Strong left brachial pulse noted.  No palpable pulsating mass.  Remainder of extremity exam is unremarkable.

## 2024-08-31 NOTE — ED PROVIDER NOTE - OBJECTIVE STATEMENT
74-year-old male with history of IR procedure on prostate in [facility] yesterday.  States he had embolization of the prostate through the left forearm.  Had complication of persistent left arm swelling bleeding from catheterization site and was kept in postop for several hours for monitoring.  Was discharged home and today noted sudden increase in left forearm swelling while shopping.  Patient applying direct pressure to site as he was instructed to do by his surgeon yesterday.  Denies fever bleeding chest pain shortness of breath cough or other symptoms or problem.  Has no PCP.

## 2024-08-31 NOTE — ED PROVIDER NOTE - CROS ED SKIN ALL NEG
Refills now time appropriate. Pended to  for review in [facility] .  Alprazolam ok to fill today. Ambien ok to fill the 19th of November. Noted in Rx's to pharmacy.      NO PDMP alerts   - - -

## 2024-08-31 NOTE — ED ADULT TRIAGE NOTE - CHIEF COMPLAINT QUOTE
"I had an IR embolization for the prostate yesterday and I believe im bleeding internally in the arm". sudden onset of L arm swelling about 20 min ago. pressure applied by patient as advised by MD over phone.

## 2024-08-31 NOTE — ED ADULT NURSE NOTE - NSICDXPASTSURGICALHX_GEN_ALL_CORE_FT
PAST SURGICAL HISTORY:  H/O colonoscopy     History of hand surgery left, 2017 ORIF wrist    History of prostate surgery     S/P tonsillectomy

## 2024-08-31 NOTE — ED ADULT NURSE NOTE - NS_SISCREENINGSR_GEN_ALL_ED
Follow-up with your primary care physician, ophthalmologist.  Take the prescribed medications as directed.  Please return to the emergency department if you develop worsening symptoms, severe pain, vomiting, weakness, changes in your vision, or anything else concerning to you.  
Negative

## 2024-08-31 NOTE — ED PROVIDER NOTE - CLINICAL SUMMARY MEDICAL DECISION MAKING FREE TEXT BOX
74-year-old male with history of IR procedure on prostate in [facility] yesterday.  States he had embolization of the prostate through the left forearm.  Had complication of persistent left arm swelling bleeding from catheterization site and was kept in postop for several hours for monitoring.  Was discharged home and today noted sudden increase in left forearm swelling while shopping.  Patient applying direct pressure to site as he was instructed to do by his surgeon yesterday.  Denies fever bleeding chest pain shortness of breath cough or other symptoms or problem.  Has no PCP.    Physical exam reveals left forearm swelling but no pulsatile mass and no diminished pulses.  Impression rule out DVT rule out persistent bleeding rule out aneurysm.  More likely hematoma.  Will apply compression and get venous and arterial Dopplers of the left arm.  May need vascular evaluation versus outpatient follow-up.

## 2024-09-24 NOTE — ASU PATIENT PROFILE, ADULT - NS PRO INFO GIVEN TO
09/24/24      Aimee V Damico  [address] [id number]    APPOINTMENT / PROCEDURE INSTRUCTION    You are scheduled for a procedure, Shoulder Injection, with Lina Tao MD    *Failure to attend a procedural visit without prior notification may result in dismissal from the practice*     Vaccines   These should be avoided within 2 weeks before or 1 week after procedures with steroid, as the steroid can interfere with vaccine response.    If You Get Sick, Have an Infection or Have a Surgical Procedure, call us at [phone]. For your benefit, it is possible that the injection may need to be rescheduled.     Location and Arrival time  [facility] at [address], in the [facility].   Your procedure is scheduled on Monday 10/07/2024 at 12:40pm.  Please arrive at 12:10 pm.    Driving   Consider having a , though you may drive to and from your procedure.    Medications  Take your scheduled medications as prescribed (unless otherwise noted below). Please do not skip taking you blood pressure or heart medications- If your blood pressure is too high then the procedure may be cancelled.   Nonsteroidal Antiinflammatory Medications (NSAIDS). You  should hold Nonsteroidal Antiinflammatory medications as follows: Hold 24 hours before injection: Ibuprofen (Advil, Motrin), Diclofenac (Voltaren)- oral only, it's okay to continue gel or cream, Indomethacin (Indocin), Ketorolac (Toradol). Hold 4 days before injection: Narproxen (Aleve, Anaprox, Naprosyn), Meloxicam (Mobic). Hold 6 days before injection: Nabumetone (Relafen), Aspirin - only if the dose is more than 81 mg (including [employer]).    Diet  You may eat as usual, though you may be more comfortable if you eat light.    Diabetic patient instruction  For procedures using steroid medication: Steroid injections start to affect blood sugar levels soon after the injection and they can remain high for 3-10  days after. If you are not feeling well (symptomatic), check your blood sugar- if it is too high for your normal, call the doctor who manages your diabetes or go to the nearest Urgent Care or Emergency Department.      Other Preparation:        Please notify the Day Surgery Staff if you have a pacemaker or implanted defibrillator   Shower or bathe on the morning of your procedure day to decrease the risk of infection.    *We understand that unplanned events may cause you to miss your appointments.  If you are unable to attend the appointment for your procedure, give us at least 24 hours of notice if possible.  Please be aware that if you miss your procedure appointment without notifying us in advance, we may no longer be able to serve you as your pain management provider.*    *Failure to follow these instructions may result in your injection / procedure being cancelled. *                  patient

## 2024-10-10 ENCOUNTER — NON-APPOINTMENT (OUTPATIENT)
Age: 74
End: 2024-10-10

## 2024-11-21 ENCOUNTER — NON-APPOINTMENT (OUTPATIENT)
Age: 74
End: 2024-11-21

## 2025-01-17 ENCOUNTER — NON-APPOINTMENT (OUTPATIENT)
Age: 75
End: 2025-01-17

## 2025-01-22 ENCOUNTER — APPOINTMENT (OUTPATIENT)
Dept: MRI IMAGING | Facility: CLINIC | Age: 75
End: 2025-01-22
Payer: MEDICARE

## 2025-01-22 PROCEDURE — 76498P: CUSTOM | Mod: 26

## 2025-01-22 PROCEDURE — 72197 MRI PELVIS W/O & W/DYE: CPT | Mod: TC

## 2025-02-04 ENCOUNTER — APPOINTMENT (OUTPATIENT)
Dept: ULTRASOUND IMAGING | Facility: CLINIC | Age: 75
End: 2025-02-04
Payer: MEDICARE

## 2025-02-04 ENCOUNTER — OUTPATIENT (OUTPATIENT)
Dept: OUTPATIENT SERVICES | Facility: HOSPITAL | Age: 75
LOS: 1 days | End: 2025-02-04
Payer: MEDICARE

## 2025-02-04 DIAGNOSIS — Z98.890 OTHER SPECIFIED POSTPROCEDURAL STATES: Chronic | ICD-10-CM

## 2025-02-04 DIAGNOSIS — N40.0 BENIGN PROSTATIC HYPERPLASIA WITHOUT LOWER URINARY TRACT SYMPTOMS: ICD-10-CM

## 2025-02-04 DIAGNOSIS — Z90.89 ACQUIRED ABSENCE OF OTHER ORGANS: Chronic | ICD-10-CM

## 2025-02-04 PROCEDURE — 76857 US EXAM PELVIC LIMITED: CPT | Mod: 26

## 2025-02-04 PROCEDURE — 76857 US EXAM PELVIC LIMITED: CPT

## 2025-02-21 NOTE — DISCHARGE NOTE ADULT - MEDICATION SUMMARY - MEDICATIONS TO TAKE
[facility] INVASIVE CARDIOLOGY -- Tom Jackson Mitra, Willner, Gabriels  Cardiac cath/EP lab - ACP Team  [phone]     Chief complaint: Patient is a 87y old  Male who presents with a chief complaint of       Subjective/Observations: Patient seen and assessed at bedside. Patient w/ persistently low SBP 70-80s throughout the day. Asymptomatic, denied dizziness, groin pain, RLQ/back pain. Mentating well, conversing with writer. Denies changes in vision, headaches, dizziness, chest pain, palpitations, SOB, abdominal pain, N/V/D, leg pain/edema or any other complaints.       ROS Negative except as per Subjective and HPI      PAST MEDICAL & SURGICAL HISTORY:  Hypertension      Lumbar back pain      Dyslipidemia      GERD (gastroesophageal reflux disease)      Spinal stenosis of lumbar region      Intervertebral lumbar disc disorder  L4-5 L5-S1      Atrial fibrillation      GI bleed      History of ruptured cerebral arteriovenous malformation (AVM)      History of hip replacement, total, bilateral  right 2016  left 2006      History of appendectomy  1943      History of cataract extraction, right  2012      History of tonsillectomy  1948          ALLERGIES:  No Known Allergies      MEDICATIONS:  acetaminophen     Tablet .. 650 milliGRAM(s) Oral every 6 hours PRN  melatonin 5 milliGRAM(s) Oral at bedtime PRN    pantoprazole    Tablet 40 milliGRAM(s) Oral before breakfast    atorvastatin 10 milliGRAM(s) Oral at bedtime    aspirin enteric coated 81 milliGRAM(s) Oral daily  clopidogrel Tablet 75 milliGRAM(s) Oral daily  sodium chloride 0.9%. 1000 milliLiter(s) IV Continuous <Continuous>      TELEMETRY:  T(C): 36.7 (02-20-25 @ 16:00), Max: 36.7 (02-19-25 @ 20:10)  HR: 57 (02-20-25 @ 17:20) (42 - 67)  BP: 132/58 (02-20-25 @ 17:20) (71/51 - 132/58)  RR: 18 (02-20-25 @ 17:20) (16 - 18)  SpO2: 93% (02-20-25 @ 17:20) (93% - 100%)  Wt(kg): --  I&O's Summary    19 Feb 2025 07:01  -  20 Feb 2025 07:00  --------------------------------------------------------  IN: 420 mL / OUT: 825 mL / NET: -405 mL    20 Feb 2025 07:01  -  20 Feb 2025 17:40  --------------------------------------------------------  IN: 400 mL / OUT: 200 mL / NET: 200 mL        Campbell:  Central/PICC/Mid Line:                                         FOCUSED PHYSICAL EXAM:  Appearance: Normal  Cardiovascular: irregularly irregular  Respiratory: Lungs clear to auscultation. No Rales, Rhonchi, Wheezing	  Vascular: : R groin site stable; soft, nontender, no hematoma, +ecchymosis lateral to puncture sites; + pulses     ECG:  	  RADIOLOGY:   DIAGNOSTIC TESTING:    < from: TTE W or WO Ultrasound Enhancing Agent (02.20.25 @ 10:10) >  CONCLUSIONS:      1. Left ventricular systolic function is normal.   2. No pericardial effusion seen.    ________________________________________________________________________________________  FINDINGS:     Left Ventricle:  Left ventricular systolic function is normal.     Pericardium:  No pericardial effusion seen.    < end of copied text >    [ ] Catheterization:  [ ] Stress Test:    [ ] OLLIE  [ ] CCTA  	  < from: Cardiac Rhythm Management (02.19.25 @ 08:49) >  CPT Code:                  91628 - Ultrasound Guidance for vascular  access    94939  - CATARINA transcatheter closure     Procedures Performed   Primary Procedures:      Left Atrial Appendage Occlusion      Conclusions   Successful CATARINA occlusion with 31 mm Watchman FLX pro     Follow-Up   Bedrest x 3 hours   CXR PA and Lateral in AM   ASA/Plavix      Complications   No complications     < end of copied text >      LABS: All labs reviewed	 	  CARDIAC MARKERS:                       8.3    9.69  )-----------( 180      ( 20 Feb 2025 13:20 )             27.3     02-20    135  |  105  |  30[H]  ----------------------------<  204[H]  4.7   |  20[L]  |  0.97    Ca    8.7      20 Feb 2025 01:09  Mg     2.0     02-20      proBNP:   Lipid Profile:   HgA1c:   TSH:    [facility] ELECTROPHYSIOLOGY  [phone]    CHIEF COMPLAINT: Patient is a 87y old male who presents with a chief complaint of Afib    HPI: 86 year old pleasant male with  PMH of HTN, HDL GERD, Afib on Eliquis x2 day (Started after last Hospitalization). Recently hospitalized due to GI bleed while on Xarelto s/p multiple transfusions and embolization while hospitalized.   Pt Presents today for prescheduled Watchman placement and presurgical testing. Pt denies any HA, CP, SOB, palpitation, N/V/D, fever, cough, chills, dizziness, or any other s/s of bleeding.     Subjective/Observations: patient seen and examined.  denies chest pain, dyspena, dizziness, palpitations, N&V, HA    Review of Systems all WNL except below indicated:    Constitutional: [ ] Fever [ ] Chills [ ] Fatigue [ ] Weight change   HEENT: [ ] Blurred vision [ ] Eye Pain [ ] Headache [ ] Runny nose [ ] Sore Throat   Respiratory: [ ] Cough [ ] Wheezing [ ] Shortness of breath  Cardiovascular: [ ] Chest Pain [ ] Palpitations [ ] LOWERY [ ] PND [ ] Orthopnea  Gastrointestinal: [ ] Abdominal Pain [ ] Diarrhea [ ] Constipation [ ] Hemorrhoids [ ] Nausea [ ] Vomiting  Genitourinary: [ ] Nocturia [ ] Dysuria [ ] Incontinence  Extremities: [ ] Swelling [ ] Joint Pain  Neurologic: [ ] Focal deficit [ ] Paresthesias [ ] Syncope  Lymphatic: [ ] Swelling [ ] Lymphadenopathy   Skin: [ ] Rash [ ] Ecchymoses [ ] Wounds [ ] Lesions  Psychiatry: [ ] Depression [ ] Suicidal/Homicidal Ideation [ ] Anxiety [ ] Sleep Disturbances  [ ] 10 point review of systems is otherwise negative except as mentioned above            [ ]Unable to obtain    PAST MEDICAL & SURGICAL HISTORY:  Hypertension    Lumbar back pain    Dyslipidemia    GERD (gastroesophageal reflux disease)    Spinal stenosis of lumbar region    Intervertebral lumbar disc disorder  L4-5 L5-S1    Atrial fibrillation    GI bleed    History of ruptured cerebral arteriovenous malformation (AVM)    History of hip replacement, total, bilateral  right 2016  left 2006    History of appendectomy  1943    History of cataract extraction, right  2012    History of tonsillectomy  1948    MEDICATIONS  (STANDING):  aspirin enteric coated 81 milliGRAM(s) Oral daily  atorvastatin 10 milliGRAM(s) Oral at bedtime  clopidogrel Tablet 75 milliGRAM(s) Oral daily  pantoprazole    Tablet 40 milliGRAM(s) Oral before breakfast    MEDICATIONS  (PRN):  acetaminophen     Tablet .. 650 milliGRAM(s) Oral every 6 hours PRN Mild Pain (1 - 3), Moderate Pain (4 - 6)  melatonin 5 milliGRAM(s) Oral at bedtime PRN Insomnia    Allergies    No Known Allergies    Intolerances    Vital Signs Last 24 Hrs  T(C): 36.8 (20 Feb 2025 20:00), Max: 36.8 (20 Feb 2025 20:00)  T(F): 98.2 (20 Feb 2025 20:00), Max: 98.2 (20 Feb 2025 20:00)  HR: 46 (20 Feb 2025 20:00) (42 - 62)  BP: 99/58 (20 Feb 2025 20:00) (71/51 - 132/58)  BP(mean): 74 (20 Feb 2025 20:00) (55 - 84)  RR: 18 (20 Feb 2025 20:00) (16 - 18)  SpO2: 98% (20 Feb 2025 20:00) (93% - 100%)    Parameters below as of 20 Feb 2025 20:00  Patient On (Oxygen Delivery Method): room air    I&O's Summary    19 Feb 2025 07:01  -  20 Feb 2025 07:00  --------------------------------------------------------  IN: 420 mL / OUT: 825 mL / NET: -405 mL    20 Feb 2025 07:01  -  21 Feb 2025 00:38  --------------------------------------------------------  IN: 640 mL / OUT: 300 mL / NET: 340 mL    FOCUSED PHYSICAL EXAM:  Neuro: No apparent distress, alert and oriented times x3, appropriate affect  Pulmonary: Non-labored, breath sounds are clear bilaterally, No wheezing, rales or rhonchi  Cardiovascular: Regular, S1 and S2, No murmurs, rubs, gallops or clicks  Site: Right groin w/ vascade x2: Soft, non tender, no bleeding or hematoma. Pulses in the right lower extremity are palpable  No clubbing, cyanosis or edema    LABS: All Labs Reviewed:                        8.3    9.69  )-----------( 180      ( 20 Feb 2025 13:20 )             27.3                         9.1    8.54  )-----------( 180      ( 20 Feb 2025 07:27 )             29.1                         8.5    6.50  )-----------( 174      ( 20 Feb 2025 01:09 )             27.1     20 Feb 2025 01:09    135    |  105    |  30     ----------------------------<  204    4.7     |  20     |  0.97     Ca    8.7        20 Feb 2025 01:09  Mg     2.0       20 Feb 2025 01:09    RESULTS:  ECHO: CONCLUSIONS:   1. OLLIE imaging and guidance were provided during Watchman device implantation. Transgastric views were not obtained due to history of GI bleeding from gastric lesions.   2. Pre-procedure: There is no evidence of thrombus in the left atrial appendage. Pre-WATCHMAN measurements are as annotated. There is borderline normal LV systolic function and borderline reduced RV systolic function. There is prolapse of the mitral valve (P2, P3, A3) with mild-to-moderate mitral regurgitation. The inter-atrial septum is intact. There is a trace pericardial effusion.   3. Post-procedure: There is a 31 mm WATCHMAN FLX PRO in the left atrial appendage. The device appears well-seated. There is no jannette-device leak. Biventricular systolic function is unchanged. There is no new valvular lesion. There is an atrial septostomy with predomiantly left-to-right flow. There is a trace pericardial effusion appearing similar to baseline imaging.    Patient is a 87y old  Male who presents with a chief complaint of         Allergies    No Known Allergies    Intolerances        Medications:  acetaminophen     Tablet .. 650 milliGRAM(s) Oral every 6 hours PRN  amLODIPine   Tablet 10 milliGRAM(s) Oral daily  aspirin enteric coated 81 milliGRAM(s) Oral daily  atorvastatin 10 milliGRAM(s) Oral at bedtime  clopidogrel Tablet 75 milliGRAM(s) Oral daily  lisinopril 40 milliGRAM(s) Oral daily  melatonin 5 milliGRAM(s) Oral at bedtime PRN  pantoprazole    Tablet 40 milliGRAM(s) Oral before breakfast      Vitals:  T(C): 36.6 (02-20-25 @ 00:47), Max: 36.7 (02-19-25 @ 16:00)  HR: 60 (02-20-25 @ 00:47) (51 - 68)  BP: 97/60 (02-20-25 @ 00:47) (88/53 - 137/99)  BP(mean): 73 (02-20-25 @ 00:47) (68 - 82)  RR: 16 (02-20-25 @ 00:47) (16 - 19)  SpO2: 96% (02-20-25 @ 00:47) (94% - 100%)  Wt(kg): --  Daily Height in cm: 175.26 (19 Feb 2025 08:54)    Daily   I&O's Summary    19 Feb 2025 07:01  -  20 Feb 2025 04:19  --------------------------------------------------------  IN: 420 mL / OUT: 300 mL / NET: 120 mL          Physical Exam:  Appearance: Normal  Eyes: PERRL, EOMI  HENT: Normal oral muscosa, NC/AT  Cardiovascular: S1S2, RRR, No M/R/G, no JVD, No Lower extremity edema  Procedural Access Site: No hematoma, Non-tender to palpation, 2+ pulse, No bruit, No Ecchymosis  Respiratory: Clear to auscultation bilaterally  Gastrointestinal: Soft, Non tender, Normal Bowel Sounds  Musculoskeletal: No clubbing, No joint deformity   Neurologic: Non-focal  Lymphatic: No lymphadenopathy  Psychiatry: AAOx3, Mood & affect appropriate  Skin: No rashes, No ecchymoses, No cyanosis    02-20    135  |  105  |  30[H]  ----------------------------<  204[H]  4.7   |  20[L]  |  0.97    Ca    8.7      20 Feb 2025 01:09  Mg     2.0     02-20              Lipid panel   Hgb A1c                         8.5    6.50  )-----------( 174      ( 20 Feb 2025 01:09 )             27.1         ECG: Afib 60 bpm I will START or STAY ON the medications listed below when I get home from the hospital:    Vicodin ES 7.5 mg-300 mg oral tablet  -- 1 tab(s) by mouth every 6 hours, As Needed - for severe pain, for moderate pain   -- Indication: For Pain of left forearm    lisinopril 10 mg oral tablet  -- 1 tab(s) by mouth once a day  -- Indication: For high blood pressure    zolpidem 5 mg oral tablet  -- 1 tab(s) by mouth once a day (at bedtime), As needed, Insomnia  -- Indication: For Insomnia    Humira Pen 40 mg/0.8 mL subcutaneous kit  --  subcutaneous every 2 weeks  -- Indication: For rheumatiod arthritis    docusate sodium 100 mg oral capsule  -- 1 cap(s) by mouth 3 times a day, As Needed - for constipation  -- Indication: For constipation I will START or STAY ON the medications listed below when I get home from the hospital:    Vicodin ES 7.5 mg-300 mg oral tablet  -- 1 tab(s) by mouth every 6 hours, As Needed - for severe pain, for moderate pain   -- [facility] Ref. # [id number]  -- Indication: For Pain of left forearm    lisinopril 10 mg oral tablet  -- 1 tab(s) by mouth once a day  -- Indication: For high blood pressure    zolpidem 5 mg oral tablet  -- 1 tab(s) by mouth once a day (at bedtime), As needed, Insomnia  -- Indication: For Insomnia    Humira Pen 40 mg/0.8 mL subcutaneous kit  --  subcutaneous every 2 weeks  -- Indication: For rheumatiod arthritis    docusate sodium 100 mg oral capsule  -- 1 cap(s) by mouth 3 times a day, As Needed - for constipation  -- Indication: For constipation

## 2025-03-21 ENCOUNTER — APPOINTMENT (OUTPATIENT)
Dept: INTERNAL MEDICINE | Facility: CLINIC | Age: 75
End: 2025-03-21
Payer: MEDICARE

## 2025-03-21 ENCOUNTER — APPOINTMENT (OUTPATIENT)
Dept: INTERNAL MEDICINE | Facility: CLINIC | Age: 75
End: 2025-03-21

## 2025-03-21 VITALS
SYSTOLIC BLOOD PRESSURE: 140 MMHG | TEMPERATURE: 98 F | RESPIRATION RATE: 14 BRPM | HEART RATE: 98 BPM | DIASTOLIC BLOOD PRESSURE: 72 MMHG | OXYGEN SATURATION: 96 %

## 2025-03-21 DIAGNOSIS — J44.1 CHRONIC OBSTRUCTIVE PULMONARY DISEASE WITH (ACUTE) EXACERBATION: ICD-10-CM

## 2025-03-21 DIAGNOSIS — I10 ESSENTIAL (PRIMARY) HYPERTENSION: ICD-10-CM

## 2025-03-21 PROCEDURE — 99213 OFFICE O/P EST LOW 20 MIN: CPT

## 2025-03-21 PROCEDURE — G2211 COMPLEX E/M VISIT ADD ON: CPT

## 2025-03-21 RX ORDER — METHYLPREDNISOLONE 4 MG/1
4 TABLET ORAL
Qty: 1 | Refills: 0 | Status: ACTIVE | COMMUNITY
Start: 2025-03-21 | End: 1900-01-01

## 2025-03-21 RX ORDER — AZITHROMYCIN 250 MG/1
250 TABLET, FILM COATED ORAL
Qty: 1 | Refills: 0 | Status: ACTIVE | COMMUNITY
Start: 2025-03-21 | End: 1900-01-01

## 2025-04-29 ENCOUNTER — APPOINTMENT (OUTPATIENT)
Dept: INTERNAL MEDICINE | Facility: CLINIC | Age: 75
End: 2025-04-29
Payer: MEDICARE

## 2025-04-29 ENCOUNTER — NON-APPOINTMENT (OUTPATIENT)
Age: 75
End: 2025-04-29

## 2025-04-29 VITALS
RESPIRATION RATE: 14 BRPM | OXYGEN SATURATION: 98 % | SYSTOLIC BLOOD PRESSURE: 150 MMHG | HEART RATE: 75 BPM | DIASTOLIC BLOOD PRESSURE: 80 MMHG

## 2025-04-29 VITALS — BODY MASS INDEX: 26.66 KG/M2 | HEIGHT: 69 IN | WEIGHT: 180 LBS

## 2025-04-29 DIAGNOSIS — R05.1 ACUTE COUGH: ICD-10-CM

## 2025-04-29 DIAGNOSIS — I10 ESSENTIAL (PRIMARY) HYPERTENSION: ICD-10-CM

## 2025-04-29 DIAGNOSIS — R80.9 PROTEINURIA, UNSPECIFIED: ICD-10-CM

## 2025-04-29 DIAGNOSIS — H10.13 ACUTE ATOPIC CONJUNCTIVITIS, BILATERAL: ICD-10-CM

## 2025-04-29 DIAGNOSIS — J44.89 OTHER SPECIFIED CHRONIC OBSTRUCTIVE PULMONARY DISEASE: ICD-10-CM

## 2025-04-29 DIAGNOSIS — Z00.00 ENCOUNTER FOR GENERAL ADULT MEDICAL EXAMINATION W/OUT ABNORMAL FINDINGS: ICD-10-CM

## 2025-04-29 DIAGNOSIS — N40.0 BENIGN PROSTATIC HYPERPLASIA WITHOUT LOWER URINARY TRACT SYMPMS: ICD-10-CM

## 2025-04-29 LAB
HEMOCCULT STL QL IA: NORMAL
HEMOCCULT STL QL IA: POSITIVE

## 2025-04-29 PROCEDURE — 36415 COLL VENOUS BLD VENIPUNCTURE: CPT

## 2025-04-29 PROCEDURE — 93000 ELECTROCARDIOGRAM COMPLETE: CPT

## 2025-04-29 PROCEDURE — G0439: CPT

## 2025-04-29 RX ORDER — OLOPATADINE HCL 1 MG/ML
0.1 SOLUTION/ DROPS OPHTHALMIC TWICE DAILY
Qty: 1 | Refills: 2 | Status: ACTIVE | COMMUNITY
Start: 2025-04-29 | End: 1900-01-01

## 2025-04-30 ENCOUNTER — LABORATORY RESULT (OUTPATIENT)
Age: 75
End: 2025-04-30

## 2025-05-06 ENCOUNTER — APPOINTMENT (OUTPATIENT)
Dept: INTERNAL MEDICINE | Facility: CLINIC | Age: 75
End: 2025-05-06
Payer: MEDICARE

## 2025-05-06 VITALS
DIASTOLIC BLOOD PRESSURE: 80 MMHG | OXYGEN SATURATION: 98 % | RESPIRATION RATE: 14 BRPM | SYSTOLIC BLOOD PRESSURE: 142 MMHG | HEART RATE: 78 BPM

## 2025-05-06 DIAGNOSIS — F32.A DEPRESSION, UNSPECIFIED: ICD-10-CM

## 2025-05-06 LAB
ALBUMIN SERPL ELPH-MCNC: 4.5 G/DL
ALP BLD-CCNC: 71 U/L
ALT SERPL-CCNC: 29 U/L
ANION GAP SERPL CALC-SCNC: 16 MMOL/L
APPEARANCE: CLEAR
AST SERPL-CCNC: 35 U/L
BASOPHILS # BLD AUTO: 0.04 K/UL
BASOPHILS NFR BLD AUTO: 0.5 %
BILIRUB SERPL-MCNC: 0.5 MG/DL
BILIRUBIN URINE: NEGATIVE
BLOOD URINE: NEGATIVE
BUN SERPL-MCNC: 23 MG/DL
CALCIUM SERPL-MCNC: 9.5 MG/DL
CHLORIDE SERPL-SCNC: 105 MMOL/L
CHOLEST SERPL-MCNC: 155 MG/DL
CO2 SERPL-SCNC: 21 MMOL/L
COLOR: NORMAL
CREAT SERPL-MCNC: 0.97 MG/DL
CREAT SPEC-SCNC: 253 MG/DL
CREAT/PROT UR: 0.2 RATIO
EGFRCR SERPLBLD CKD-EPI 2021: 82 ML/MIN/1.73M2
EOSINOPHIL # BLD AUTO: 0.23 K/UL
EOSINOPHIL NFR BLD AUTO: 2.9 %
ESTIMATED AVERAGE GLUCOSE: 105 MG/DL
GLUCOSE QUALITATIVE U: NEGATIVE MG/DL
GLUCOSE SERPL-MCNC: 90 MG/DL
HBA1C MFR BLD HPLC: 5.3 %
HCT VFR BLD CALC: 38.8 %
HDLC SERPL-MCNC: 59 MG/DL
HGB BLD-MCNC: 13.7 G/DL
IMM GRANULOCYTES NFR BLD AUTO: 0.4 %
KETONES URINE: ABNORMAL MG/DL
LDLC SERPL-MCNC: 75 MG/DL
LEUKOCYTE ESTERASE URINE: NEGATIVE
LYMPHOCYTES # BLD AUTO: 2.82 K/UL
LYMPHOCYTES NFR BLD AUTO: 35.9 %
MAN DIFF?: NORMAL
MCHC RBC-ENTMCNC: 33.5 PG
MCHC RBC-ENTMCNC: 35.3 G/DL
MCV RBC AUTO: 94.9 FL
MONOCYTES # BLD AUTO: 0.61 K/UL
MONOCYTES NFR BLD AUTO: 7.8 %
NEUTROPHILS # BLD AUTO: 4.13 K/UL
NEUTROPHILS NFR BLD AUTO: 52.5 %
NITRITE URINE: NEGATIVE
NONHDLC SERPL-MCNC: 95 MG/DL
PH URINE: 5.5
PLATELET # BLD AUTO: 148 K/UL
POTASSIUM SERPL-SCNC: 4.2 MMOL/L
PROT SERPL-MCNC: 6.8 G/DL
PROT UR-MCNC: 59 MG/DL
PROTEIN URINE: 100 MG/DL
PSA SERPL-MCNC: 0.34 NG/ML
RBC # BLD: 4.09 M/UL
RBC # FLD: 12.8 %
SODIUM SERPL-SCNC: 142 MMOL/L
SPECIFIC GRAVITY URINE: >1.03
TRIGL SERPL-MCNC: 112 MG/DL
TSH SERPL-ACNC: 0.64 UIU/ML
UROBILINOGEN URINE: 0.2 MG/DL
WBC # FLD AUTO: 7.86 K/UL

## 2025-05-06 PROCEDURE — G0444 DEPRESSION SCREEN ANNUAL: CPT | Mod: 59

## 2025-05-06 PROCEDURE — 99213 OFFICE O/P EST LOW 20 MIN: CPT

## 2025-05-06 PROCEDURE — G2211 COMPLEX E/M VISIT ADD ON: CPT

## 2025-05-06 RX ORDER — ESCITALOPRAM OXALATE 10 MG/1
10 TABLET ORAL
Qty: 30 | Refills: 0 | Status: ACTIVE | COMMUNITY
Start: 2025-05-06 | End: 1900-01-01

## 2025-05-12 ENCOUNTER — APPOINTMENT (OUTPATIENT)
Dept: PULMONOLOGY | Facility: CLINIC | Age: 75
End: 2025-05-12
Payer: MEDICARE

## 2025-05-12 VITALS
RESPIRATION RATE: 16 BRPM | OXYGEN SATURATION: 96 % | DIASTOLIC BLOOD PRESSURE: 71 MMHG | HEART RATE: 66 BPM | SYSTOLIC BLOOD PRESSURE: 115 MMHG

## 2025-05-12 DIAGNOSIS — J44.89 OTHER SPECIFIED CHRONIC OBSTRUCTIVE PULMONARY DISEASE: ICD-10-CM

## 2025-05-12 PROCEDURE — 94010 BREATHING CAPACITY TEST: CPT

## 2025-05-12 PROCEDURE — 99213 OFFICE O/P EST LOW 20 MIN: CPT | Mod: 25

## 2025-08-11 ENCOUNTER — APPOINTMENT (OUTPATIENT)
Dept: PULMONOLOGY | Facility: CLINIC | Age: 75
End: 2025-08-11

## (undated) DEVICE — GLV 7.5 PROTEXIS (WHITE)

## (undated) DEVICE — Device

## (undated) DEVICE — GLV 8 PROTEXIS (CREAM) MICRO

## (undated) DEVICE — SUT RETRIEVER S&N LAVENDER

## (undated) DEVICE — SOL IRR POUR H2O 1500ML

## (undated) DEVICE — SYR LUER LOK 10CC

## (undated) DEVICE — DRSG KLING 2"

## (undated) DEVICE — SUT FIBERWIRE #2 38" STRAND 1 BLUE T-5 TAPER

## (undated) DEVICE — SUT NDL MARTIN UTERINE 1/2 CIRCLE REVERSE CUTTING 0.050" X 1.677"

## (undated) DEVICE — DRSG WEBRIL 3"

## (undated) DEVICE — PREP CHLORAPREP HI-LITE ORANGE 26ML

## (undated) DEVICE — POSITIONER STRAP ARMBOARD VELCRO TS-30

## (undated) DEVICE — SUT VICRYL 0 36" CT-1 UNDYED

## (undated) DEVICE — PACK LIJ BASIC ORTHO

## (undated) DEVICE — DRILL BIT STRYKER ORTHO CANN 2.6MM

## (undated) DEVICE — SUT VICRYL 2-0 27" FS-1 UNDYED

## (undated) DEVICE — TOURNIQUET ESMARK 4"

## (undated) DEVICE — FRAZIER SUCTION TIP 8FR

## (undated) DEVICE — FRAZIER SUCTION TIP 10FR

## (undated) DEVICE — DRSG STOCKINETTE IMPERVIOUS XL

## (undated) DEVICE — SUT NYLON 3-0 18" PS-2

## (undated) DEVICE — ELCTR GROUNDING PAD ADULT COVIDIEN

## (undated) DEVICE — DRSG COBAN 4"

## (undated) DEVICE — DRSG XEROFORM 5 X 9"

## (undated) DEVICE — SUT FIBERLINK 2 26" (BLUE)

## (undated) DEVICE — LABELS BLANK W PEN

## (undated) DEVICE — SOL IRR POUR NS 0.9% 1500ML

## (undated) DEVICE — BRACE KNEE IMMOBILIZER SPLINT SUPER LARGE 24"

## (undated) DEVICE — SUT VICRYL 2-0 27" SH UNDYED

## (undated) DEVICE — SUCTION YANKAUER OPEN TIP NO VENT CURVE

## (undated) DEVICE — WARMING BLANKET FULL ADULT

## (undated) DEVICE — VENODYNE/SCD SLEEVE CALF MEDIUM

## (undated) DEVICE — DRSG AQUACEL 3.5 X 10"

## (undated) DEVICE — ELCTR BOVIE PENCIL SMOKE EVACUATION